# Patient Record
Sex: MALE | Race: WHITE | NOT HISPANIC OR LATINO | Employment: FULL TIME | URBAN - METROPOLITAN AREA
[De-identification: names, ages, dates, MRNs, and addresses within clinical notes are randomized per-mention and may not be internally consistent; named-entity substitution may affect disease eponyms.]

---

## 2017-02-01 ENCOUNTER — EXTERNAL ORDER RESULTS (OUTPATIENT)
Dept: HEALTH INFORMATION MANAGEMENT | Facility: CLINIC | Age: 47
End: 2017-02-01

## 2017-03-30 ENCOUNTER — OFFICE VISIT (OUTPATIENT)
Dept: FAMILY MEDICINE | Facility: CLINIC | Age: 47
End: 2017-03-30
Payer: COMMERCIAL

## 2017-03-30 VITALS
HEIGHT: 70 IN | BODY MASS INDEX: 38.65 KG/M2 | DIASTOLIC BLOOD PRESSURE: 85 MMHG | SYSTOLIC BLOOD PRESSURE: 138 MMHG | WEIGHT: 270 LBS | TEMPERATURE: 97.6 F | HEART RATE: 83 BPM

## 2017-03-30 DIAGNOSIS — E78.5 HYPERLIPIDEMIA LDL GOAL <100: ICD-10-CM

## 2017-03-30 DIAGNOSIS — E11.9 TYPE 2 DIABETES MELLITUS WITHOUT COMPLICATION, WITHOUT LONG-TERM CURRENT USE OF INSULIN (H): Primary | ICD-10-CM

## 2017-03-30 DIAGNOSIS — I10 ESSENTIAL HYPERTENSION WITH GOAL BLOOD PRESSURE LESS THAN 140/90: ICD-10-CM

## 2017-03-30 DIAGNOSIS — E66.9 OBESITY, UNSPECIFIED OBESITY SEVERITY, UNSPECIFIED OBESITY TYPE: ICD-10-CM

## 2017-03-30 LAB
ALBUMIN SERPL-MCNC: 3.9 G/DL (ref 3.4–5)
ALP SERPL-CCNC: 68 U/L (ref 40–150)
ALT SERPL W P-5'-P-CCNC: 46 U/L (ref 0–70)
ANION GAP SERPL CALCULATED.3IONS-SCNC: 9 MMOL/L (ref 3–14)
AST SERPL W P-5'-P-CCNC: 28 U/L (ref 0–45)
BILIRUB SERPL-MCNC: 0.5 MG/DL (ref 0.2–1.3)
BUN SERPL-MCNC: 11 MG/DL (ref 7–30)
CALCIUM SERPL-MCNC: 8.8 MG/DL (ref 8.5–10.1)
CHLORIDE SERPL-SCNC: 101 MMOL/L (ref 94–109)
CHOLEST SERPL-MCNC: 174 MG/DL
CO2 SERPL-SCNC: 26 MMOL/L (ref 20–32)
CREAT SERPL-MCNC: 0.7 MG/DL (ref 0.66–1.25)
GFR SERPL CREATININE-BSD FRML MDRD: ABNORMAL ML/MIN/1.7M2
GLUCOSE SERPL-MCNC: 239 MG/DL (ref 70–99)
HBA1C MFR BLD: 10.7 % (ref 4.3–6)
HDLC SERPL-MCNC: 45 MG/DL
LDLC SERPL CALC-MCNC: 84 MG/DL
NONHDLC SERPL-MCNC: 129 MG/DL
POTASSIUM SERPL-SCNC: 3.5 MMOL/L (ref 3.4–5.3)
PROT SERPL-MCNC: 7.8 G/DL (ref 6.8–8.8)
SODIUM SERPL-SCNC: 136 MMOL/L (ref 133–144)
TRIGL SERPL-MCNC: 226 MG/DL

## 2017-03-30 PROCEDURE — 80053 COMPREHEN METABOLIC PANEL: CPT | Performed by: FAMILY MEDICINE

## 2017-03-30 PROCEDURE — 36415 COLL VENOUS BLD VENIPUNCTURE: CPT | Performed by: FAMILY MEDICINE

## 2017-03-30 PROCEDURE — 80061 LIPID PANEL: CPT | Performed by: FAMILY MEDICINE

## 2017-03-30 PROCEDURE — 99214 OFFICE O/P EST MOD 30 MIN: CPT | Performed by: FAMILY MEDICINE

## 2017-03-30 PROCEDURE — 83036 HEMOGLOBIN GLYCOSYLATED A1C: CPT | Performed by: FAMILY MEDICINE

## 2017-03-30 RX ORDER — GLIPIZIDE 5 MG/1
5 TABLET ORAL
Qty: 180 TABLET | Refills: 1 | Status: SHIPPED | OUTPATIENT
Start: 2017-03-30 | End: 2017-09-25

## 2017-03-30 RX ORDER — AMLODIPINE BESYLATE 10 MG/1
10 TABLET ORAL DAILY
Qty: 90 TABLET | Refills: 1 | Status: SHIPPED | OUTPATIENT
Start: 2017-03-30 | End: 2017-12-27

## 2017-03-30 RX ORDER — LISINOPRIL AND HYDROCHLOROTHIAZIDE 20; 25 MG/1; MG/1
2 TABLET ORAL DAILY
Qty: 180 TABLET | Refills: 1 | Status: SHIPPED | OUTPATIENT
Start: 2017-03-30 | End: 2017-12-27

## 2017-03-30 RX ORDER — SIMVASTATIN 20 MG
20 TABLET ORAL AT BEDTIME
Qty: 90 TABLET | Refills: 1 | Status: SHIPPED | OUTPATIENT
Start: 2017-03-30 | End: 2017-09-25

## 2017-03-30 RX ORDER — CARVEDILOL 25 MG/1
25 TABLET ORAL 2 TIMES DAILY WITH MEALS
Qty: 180 TABLET | Refills: 1 | Status: SHIPPED | OUTPATIENT
Start: 2017-03-30 | End: 2017-12-27

## 2017-03-30 ASSESSMENT — PAIN SCALES - GENERAL: PAINLEVEL: NO PAIN (0)

## 2017-03-30 NOTE — PROGRESS NOTES
SUBJECTIVE:                                                    Davie Banuelos is a 46 year old male who presents to clinic today for the following health issues:       Diabetes Follow-up      Patient is checking blood sugars: daily averaging from 130-240's    Diabetic concerns: blood sugar frequently over 200     Symptoms of hypoglycemia (low blood sugar): none     Paresthesias (numbness or burning in feet) or sores: No     Date of last diabetic eye exam: 02/2017       Amount of exercise or physical activity: 6-7 days/week for an average of 30-45 minutes    Problems taking medications regularly: No    Medication side effects: none    Diet: low salt and low fat/cholesterol      none    Sugars higher    Living out of hotel since in Oct     Working in [place].    Done with that project now    Mostly eating out    Problem list and histories reviewed & adjusted, as indicated.  Additional history: as documented         Reviewed and updated as needed this visit by clinical staff       Reviewed and updated as needed this visit by Provider          high 100s to low 200s    Blood pressure 130s over 80s often  142 systolic this am   No chest pain / breathing problems      Exercise some     Still active job construction    Saw eye doctor a month ago, normal.  No diabetes change in eye.  Done at outside clinic.       Physical Exam   Constitutional: He is oriented to person, place, and time and well-developed, well-nourished, and in no distress. No distress.   HENT:   Head: Normocephalic and atraumatic.   Eyes: Conjunctivae are normal.   Neck: Carotid bruit is not present.   Cardiovascular: Normal rate, regular rhythm, normal heart sounds and intact distal pulses.  Exam reveals no gallop and no friction rub.    No murmur heard.  Pulmonary/Chest: Effort normal and breath sounds normal. No respiratory distress. He has no wheezes. He has no rales.   Musculoskeletal: He exhibits no edema.   Neurological: He is alert and  oriented to person, place, and time.   Skin: He is not diaphoretic.   Psychiatric: Mood and affect normal.       ASSESSMENT / PLAN:  (E11.9) Type 2 diabetes mellitus without complication, without long-term current use of insulin (H)  (primary encounter diagnosis)  Comment: sugars high recently.  Expect hemoglobin a1c to be high also.  Refill current meds and add 3rd oral agent.  Discussed in detail.  Then see us in 2-3 months   Plan: glipiZIDE (GLUCOTROL) 5 MG tablet, metFORMIN         (GLUCOPHAGE) 1000 MG tablet, sitagliptin         (JANUVIA) 25 MG tablet, Hemoglobin A1c             (I10) Essential hypertension with goal blood pressure less than 140/90  Comment: on recheck barely at goal   Plan: lisinopril-hydrochlorothiazide         (PRINZIDE/ZESTORETIC) 20-25 MG per tablet,         amLODIPine (NORVASC) 10 MG tablet, carvedilol         (COREG) 25 MG tablet        Refill same meds    (E78.5) Hyperlipidemia LDL goal <100  Comment: fasting today   Plan: simvastatin (ZOCOR) 20 MG tablet, Lipid panel         reflex to direct LDL, Comprehensive metabolic         panel        Refill med recheck labs     (E66.9) Obesity, unspecified obesity severity, unspecified obesity type  Comment: wt is up; eating out lots recently, should be better now   Plan: keep working on healthy diet/exercise and wt loss          I reviewed the patient's medications, allergies, medical history, family history, and social history.    Sean Dougherty MD

## 2017-03-30 NOTE — MR AVS SNAPSHOT
"              After Visit Summary   3/30/2017    Davie Banuelos    MRN: [id number]           Patient Information     Date Of Birth          1970        Visit Information        Provider Department      3/30/2017 6:40 AM Sean Dougherty MD [facility]        Today's Diagnoses     Essential hypertension with goal blood pressure less than 140/90        Hyperlipidemia LDL goal <100        Type 2 diabetes mellitus without complication, without long-term current use of insulin (H)          Care Instructions    Stay on same medications    Add the new once daily pill Januvia ( sitagliptin )    Keep working on healthy diet/exercise and wt loss    Plan to see us in 2-3 months           Follow-ups after your visit        Who to contact     If you have questions or need follow up information about today's clinic visit or your schedule please contact [facility] directly at [phone].  Normal or non-critical lab and imaging results will be communicated to you by MyChart, letter or phone within 4 business days after the clinic has received the results. If you do not hear from us within 7 days, please contact the clinic through MyChart or phone. If you have a critical or abnormal lab result, we will notify you by phone as soon as possible.  Submit refill requests through Kiip or call your pharmacy and they will forward the refill request to us. Please allow 3 business days for your refill to be completed.          Additional Information About Your Visit        MyChart Information     Kiip lets you send messages to your doctor, view your test results, renew your prescriptions, schedule appointments and more. To sign up, go to [email or web address] . Click on \"Log in\" on the left side of the screen, which will take you to the Welcome page. Then click on \"Sign up Now\" on the right side of the page.     You will be asked to enter the access code listed below, as well as " "some personal information. Please follow the directions to create your username and password.     Your access code is: [email or web address]  Expires: 2017  7:04 AM     Your access code will  in 90 days. If you need help or a new code, please call your [facility] clinic or [phone].        Care EveryWhere ID     This is your Care EveryWhere ID. This could be used by other organizations to access your [facility] medical records  [id number]        Your Vitals Were     Pulse Temperature Height BMI (Body Mass Index)          83 97.6  F (36.4  C) (Oral) 5' 10\" (1.778 m) 38.74 kg/m2         Blood Pressure from Last 3 Encounters:   17 138/85   16 136/86   06/30/15 154/86    Weight from Last 3 Encounters:   17 270 lb (122.5 kg)   16 258 lb (117 kg)   06/30/15 264 lb (119.7 kg)              We Performed the Following     Comprehensive metabolic panel     Hemoglobin A1c     Lipid panel reflex to direct LDL          Today's Medication Changes          These changes are accurate as of: 3/30/17  7:04 AM.  If you have any questions, ask your nurse or doctor.               Start taking these medicines.        Dose/Directions    sitagliptin 25 MG tablet   Commonly known as:  JANUVIA   Used for:  Type 2 diabetes mellitus without complication, without long-term current use of insulin (H)   Started by:  Sean Dougherty MD        Dose:  25 mg   Take 1 tablet (25 mg) by mouth daily   Quantity:  90 tablet   Refills:  0            Where to get your medicines      These medications were sent to [id number] - [place], [place] - [address] [place] [address] AT [facility]  [address], [place] [address]     Phone:  [phone]     amLODIPine 10 MG tablet    carvedilol 25 MG tablet    glipiZIDE 5 MG tablet    lisinopril-hydrochlorothiazide 20-25 MG per tablet    metFORMIN 1000 MG tablet    simvastatin 20 MG tablet    sitagliptin 25 MG tablet                " Primary Care Provider Office Phone # Fax #    Sean Dougherty -[phone]       [facility] [address]  [place]. \Hospital [facility] Outlook.\"" [id number]        Thank you!     Thank you for choosing [facility]  for your care. Our goal is always to provide you with excellent care. Hearing back from our patients is one way we can continue to improve our services. Please take a few minutes to complete the written survey that you may receive in the mail after your visit with us. Thank you!             Your Updated Medication List - Protect others around you: Learn how to safely use, store and throw away your medicines at www.disposemymeds.org.          This list is accurate as of: 3/30/17  7:04 AM.  Always use your most recent med list.                   Brand Name Dispense Instructions for use    ACCU-CHEK SOFTCLIX LANCETS      Test 2 times daily       amLODIPine 10 MG tablet    NORVASC    90 tablet    Take 1 tablet (10 mg) by mouth daily       aspirin 81 MG tablet      Take 1 tablet by mouth daily.       blood glucose monitoring test strip    ACCU-CHEK SWETA    100 each    Test twice daily (labile sugars)       carvedilol 25 MG tablet    COREG    180 tablet    Take 1 tablet (25 mg) by mouth 2 times daily (with meals)       glipiZIDE 5 MG tablet    GLUCOTROL    180 tablet    Take 1 tablet (5 mg) by mouth 2 times daily (before meals)       Krill Oil Caps      Take  by mouth daily.       lisinopril-hydrochlorothiazide 20-25 MG per tablet    PRINZIDE/ZESTORETIC    180 tablet    Take 2 tablets by mouth daily       metFORMIN 1000 MG tablet    GLUCOPHAGE    180 tablet    Take 1 tablet (1,000 mg) by mouth 2 times daily (with meals)       simvastatin 20 MG tablet    ZOCOR    90 tablet    Take 1 tablet (20 mg) by mouth At Bedtime       sitagliptin 25 MG tablet    JANUVIA    90 tablet    Take 1 tablet (25 mg) by mouth daily

## 2017-03-30 NOTE — LETTER
[facility]  [address] [place]  [address]  [phone]        March 31, 2017    Davie Banuelos  [address] [facility] [address]        Dear Davie,    As expected the diabetes test ( hemoglobin a1c ) is very high.     Start the new diabetes pill, continue the old ones, and keep working on healthy diet/exercise and weight loss.     See us in 2-3 months.     Other labs are okay.    Results for orders placed or performed in visit on 03/30/17   Lipid panel reflex to direct LDL   Result Value Ref Range    Cholesterol 174 <200 mg/dL    Triglycerides 226 (H) <150 mg/dL    HDL Cholesterol 45 >39 mg/dL    LDL Cholesterol Calculated 84 <100 mg/dL    Non HDL Cholesterol 129 <130 mg/dL   Hemoglobin A1c   Result Value Ref Range    Hemoglobin A1C 10.7 (H) 4.3 - 6.0 %   Comprehensive metabolic panel   Result Value Ref Range    Sodium 136 133 - 144 mmol/L    Potassium 3.5 3.4 - 5.3 mmol/L    Chloride 101 94 - 109 mmol/L    Carbon Dioxide 26 20 - 32 mmol/L    Anion Gap 9 3 - 14 mmol/L    Glucose 239 (H) 70 - 99 mg/dL    Urea Nitrogen 11 7 - 30 mg/dL    Creatinine 0.70 0.66 - 1.25 mg/dL    GFR Estimate >90  Non  GFR Calc   >60 mL/min/1.7m2    GFR Estimate If Black >90   GFR Calc   >60 mL/min/1.7m2    Calcium 8.8 8.5 - 10.1 mg/dL    Bilirubin Total 0.5 0.2 - 1.3 mg/dL    Albumin 3.9 3.4 - 5.0 g/dL    Protein Total 7.8 6.8 - 8.8 g/dL    Alkaline Phosphatase 68 40 - 150 U/L    ALT 46 0 - 70 U/L    AST 28 0 - 45 U/L       If you have any questions please call the clinic at [phone].    Sincerely,    Sean GUILLENL

## 2017-03-30 NOTE — NURSING NOTE
"Chief Complaint   Patient presents with     Diabetes     Health Maintenance       Initial BP (!) 161/92 (BP Location: Left arm, Patient Position: Chair, Cuff Size: Adult Regular)  Pulse 83  Temp 97.6  F (36.4  C) (Oral)  Ht 5' 10\" (1.778 m)  Wt 270 lb (122.5 kg)  BMI 38.74 kg/m2 Estimated body mass index is 38.74 kg/(m^2) as calculated from the following:    Height as of this encounter: 5' 10\" (1.778 m).    Weight as of this encounter: 270 lb (122.5 kg).  Medication Reconciliation: complete   Marisela Milligan CMA      "

## 2017-03-30 NOTE — PATIENT INSTRUCTIONS
Stay on same medications    Add the new once daily pill Januvia ( sitagliptin )    Keep working on healthy diet/exercise and wt loss    Plan to see us in 2-3 months

## 2017-03-31 NOTE — PROGRESS NOTES
As expected the diabetes test ( hemoglobin a1c ) is very high.    Start the new diabetes pill, continue the old ones, and keep working on healthy diet/exercise and weight loss.    See us in 2-3 months.    Other labs are okay.    Sean Dougherty MD

## 2017-04-03 ENCOUNTER — TELEPHONE (OUTPATIENT)
Dept: FAMILY MEDICINE | Facility: CLINIC | Age: 47
End: 2017-04-03

## 2017-04-03 DIAGNOSIS — E11.9 TYPE 2 DIABETES MELLITUS WITHOUT COMPLICATION, WITHOUT LONG-TERM CURRENT USE OF INSULIN (H): ICD-10-CM

## 2017-04-03 NOTE — TELEPHONE ENCOUNTER
Called pharmacy, patient picked up on the 31st. Used a coupon so no co-pay. Girlfriend says that they downloaded a coupon for 1 month free. It is usually 400.00 per month   Needing an alternative to Januvia.    E11.9) Type 2 diabetes mellitus without complication, without long-term current use of insulin (H) (primary encounter diagnosis)  Comment: sugars high recently. Expect hemoglobin a1c to be high also. Refill current meds and add 3rd oral agent. Discussed in detail. Then see us in 2-3 months   Plan: glipiZIDE (GLUCOTROL) 5 MG tablet, metFORMIN   (GLUCOPHAGE) 1000 MG tablet, sitagliptin   (JANUVIA) 25 MG tablet, Hemoglobin A1c    Routed to provider to advise.  Lorelei Ureña RN  [facility]

## 2017-04-03 NOTE — TELEPHONE ENCOUNTER
"Faxed message from pharmacy:  \"Medication is too expensive.  Patient would like a cheaper alternative.  Thanks\"  "

## 2017-04-09 RX ORDER — SAXAGLIPTIN 2.5 MG/1
2.5 TABLET, FILM COATED ORAL DAILY
Qty: 30 TABLET | Refills: 2 | Status: SHIPPED | OUTPATIENT
Start: 2017-04-09 | End: 2017-04-10

## 2017-04-09 NOTE — TELEPHONE ENCOUNTER
I sent in an alternative, one month with a couple refills.  See us in clinic in 2-3 months.    Please inform patient.    Sean Dougherty MD

## 2017-04-10 ENCOUNTER — TELEPHONE (OUTPATIENT)
Dept: FAMILY MEDICINE | Facility: CLINIC | Age: 47
End: 2017-04-10

## 2017-04-10 DIAGNOSIS — E11.9 TYPE 2 DIABETES MELLITUS WITHOUT COMPLICATION, WITHOUT LONG-TERM CURRENT USE OF INSULIN (H): ICD-10-CM

## 2017-04-10 RX ORDER — SAXAGLIPTIN 2.5 MG/1
2.5 TABLET, FILM COATED ORAL DAILY
Qty: 30 TABLET | Refills: 2 | Status: SHIPPED | OUTPATIENT
Start: 2017-04-10 | End: 2017-12-27

## 2017-04-10 NOTE — TELEPHONE ENCOUNTER
PA is needed for the medication, Onglyza 2.5 mg.     Insurance has been called.  Alternatives that are covered are: na        Would you like to start PA or Rx alternative medication?    If PA, please list all medications this patient has tried along with any contraindications that may have been experienced in the past. Thank you.    Pharmacy: [facility]   Phone: [phone]    Insurance Plan: [employer]  Phone:   Pt ID:   Group:   PA started through cover my meds will wait for response from insurance  Forwarded to Dr. Dougherty for review.

## 2017-04-10 NOTE — TELEPHONE ENCOUNTER
Pt's girlfriend called back and said she could not understand message left for him. Did not see authorization to discuss so I told her I would take a message to have someone call back.  .Mirta Farrar  Patient Representative

## 2017-04-10 NOTE — TELEPHONE ENCOUNTER
Patient's girl friend is calling to check the status of the medication that was going to be subscribed instead of Januvia.  Patient states that the pharmacy has not received anything yet.  Patient uses [facility].    Please call [phone]     Samantha Martinez  Patient Representative

## 2017-04-11 NOTE — TELEPHONE ENCOUNTER
PA denied.  Reason given:      Patient has not failed trial of formulary medication: Jentadueto or Tradjenta  Patient notified:  Letter sent by insurance company and letter forwarded to [employer]. I did call and notify pharmacy.  Will send to Dr. Dougherty for his review  Marisela Milligan CMA

## 2017-04-11 NOTE — TELEPHONE ENCOUNTER
Called and spoke with Alda. Explained that  changed to alternative. They will finish out the 1 month of Onglyza that they currently have, then switch to Tradjenta, Dr. Dougherty is fine with that.     Called pharmacy and had them run the new Rx for Tradjenta.  However the pt has a copay of $394, due to deductible.     After reviewing the chart, I noticed that the HELEN in contacts is NOT for Alda. It is an error and is for [facility].     Tried to call the pt back to inform him of this error. Unable to reach him and left message for him to call the clinic back.    Alda called back and I asked her for another number to reach the pt, she gave me [phone].    I tried to call the pt at that number, but no answer and no voicemail is set up.    Called pt back at number listed in chart, Alda answered, I asked her to have the pt call the clinic back on the TC line at [phone]. If no one answers leave a message stating it's okay for us to have talked to Alda today.     Cherry Rocha MA

## 2017-04-11 NOTE — TELEPHONE ENCOUNTER
Patient returning call, left v/m on TC line, giving OK to speak with Isabelle.  Can call him with questions with [phone].

## 2017-04-17 ENCOUNTER — TELEPHONE (OUTPATIENT)
Dept: FAMILY MEDICINE | Facility: CLINIC | Age: 47
End: 2017-04-17

## 2017-04-17 DIAGNOSIS — E11.9 TYPE 2 DIABETES MELLITUS WITHOUT COMPLICATION, WITHOUT LONG-TERM CURRENT USE OF INSULIN (H): Primary | ICD-10-CM

## 2017-04-17 RX ORDER — LIRAGLUTIDE 6 MG/ML
1.2 INJECTION SUBCUTANEOUS DAILY
Qty: 6 ML | Refills: 2 | Status: SHIPPED | OUTPATIENT
Start: 2017-04-17 | End: 2017-12-27

## 2017-04-17 NOTE — TELEPHONE ENCOUNTER
Reason for Call:  Other     Detailed comments: patient had a medication that he could not afford so Dr Dougherty had prescribed a new one (linagliptin (TRADJENTA) 5 MG TABS tablet) this script cost $394 and the patient cannot afford this one too.  Please call patient to see if he can get something else.    Phone Number Patient can be reached at: Home number on file [phone] (home)    Best Time: any    Can we leave a detailed message on this number? YES    Call taken on 4/17/2017 at 10:30 AM by Gloria Calvillo

## 2017-04-17 NOTE — TELEPHONE ENCOUNTER
I sent in a different med.  It is Victoza which is injectable once daily but is not insulin.    Please inform patient.    Sean Dougherty MD

## 2017-04-18 NOTE — TELEPHONE ENCOUNTER
Patients significant other, Alda called back returning call from yesterday.    I informed her that we do not have HELEN for us to talk with her.  Told her that Dr Dougherty sent in a new script to pharmacy.    Patient hung up on me when I was talking to her about the Release for us to talk to you about patient.    Gloria Calvillo

## 2017-04-18 NOTE — TELEPHONE ENCOUNTER
Called patient at [phone] (home) .  We do not have a HELEN to talk to Alda.  Left message on voicemail to return phone call to triage.  Gege Benitez RN [facility] Triage.

## 2017-08-09 ENCOUNTER — TELEPHONE (OUTPATIENT)
Dept: FAMILY MEDICINE | Facility: CLINIC | Age: 47
End: 2017-08-09

## 2017-08-09 NOTE — TELEPHONE ENCOUNTER
I called and notified patient he will call and schedule when he gets back in town  Marisela Milligan CMA

## 2017-08-09 NOTE — TELEPHONE ENCOUNTER
Patient on fail list for diabetes/ hemoglobin a1c..  We had seen him in spring and added additional, and he was to follow up in a couple months.  Thus due for clinic appointment.    Please inform patient.    Sean Dougherty MD

## 2017-09-25 ENCOUNTER — TELEPHONE (OUTPATIENT)
Dept: FAMILY MEDICINE | Facility: CLINIC | Age: 47
End: 2017-09-25

## 2017-09-25 DIAGNOSIS — E11.9 TYPE 2 DIABETES MELLITUS WITHOUT COMPLICATION, WITHOUT LONG-TERM CURRENT USE OF INSULIN (H): ICD-10-CM

## 2017-09-25 DIAGNOSIS — E78.5 HYPERLIPIDEMIA LDL GOAL <100: ICD-10-CM

## 2017-09-25 NOTE — TELEPHONE ENCOUNTER
glipiZIDE (GLUCOTROL) 5 MG tablet         Last Written Prescription Date: 3-30-17  Last Fill Quantity: 180, # refills: 1  Last Office Visit with [facility], [facility] or [facility] prescribing provider:  3-30-17        BP Readings from Last 3 Encounters:   03/30/17 138/85   06/24/16 136/86   06/30/15 154/86     Lab Results   Component Value Date    MICROL 28 06/24/2016     Lab Results   Component Value Date    UMALCR 31.39 06/24/2016     Creatinine   Date Value Ref Range Status   03/30/2017 0.70 0.66 - 1.25 mg/dL Final   ]  GFR Estimate   Date Value Ref Range Status   03/30/2017 >90  Non  GFR Calc   >60 mL/min/1.7m2 Final   06/24/2016 >90  Non  GFR Calc   >60 mL/min/1.7m2 Final   06/30/2015 >90  Non  GFR Calc   >60 mL/min/1.7m2 Final     GFR Estimate If Black   Date Value Ref Range Status   03/30/2017 >90   GFR Calc   >60 mL/min/1.7m2 Final   06/24/2016 >90   GFR Calc   >60 mL/min/1.7m2 Final   06/30/2015 >90   GFR Calc   >60 mL/min/1.7m2 Final     Lab Results   Component Value Date    CHOL 174 03/30/2017     Lab Results   Component Value Date    HDL 45 03/30/2017     Lab Results   Component Value Date    LDL 84 03/30/2017     Lab Results   Component Value Date    TRIG 226 03/30/2017     Lab Results   Component Value Date    CHOLHDLRATIO 3.6 06/30/2015     Lab Results   Component Value Date    AST 28 03/30/2017     Lab Results   Component Value Date    ALT 46 03/30/2017     Lab Results   Component Value Date    A1C 10.7 03/30/2017    A1C 9.8 06/24/2016    A1C 10.8 06/30/2015    A1C 9.0 10/01/2014    A1C 7.9 10/11/2013     Potassium   Date Value Ref Range Status   03/30/2017 3.5 3.4 - 5.3 mmol/L Final     metFORMIN (GLUCOPHAGE) 1000 MG tablet         Last Written Prescription Date: 3-30-17  Last Fill Quantity: 180, # refills: 1  Last Office Visit with [facility], [facility] or [facility] prescribing provider:  3-30-17        BP Readings from Last 3  Encounters:   03/30/17 138/85   06/24/16 136/86   06/30/15 154/86     Lab Results   Component Value Date    MICROL 28 06/24/2016     Lab Results   Component Value Date    UMALCR 31.39 06/24/2016     Creatinine   Date Value Ref Range Status   03/30/2017 0.70 0.66 - 1.25 mg/dL Final   ]  GFR Estimate   Date Value Ref Range Status   03/30/2017 >90  Non  GFR Calc   >60 mL/min/1.7m2 Final   06/24/2016 >90  Non  GFR Calc   >60 mL/min/1.7m2 Final   06/30/2015 >90  Non  GFR Calc   >60 mL/min/1.7m2 Final     GFR Estimate If Black   Date Value Ref Range Status   03/30/2017 >90   GFR Calc   >60 mL/min/1.7m2 Final   06/24/2016 >90   GFR Calc   >60 mL/min/1.7m2 Final   06/30/2015 >90   GFR Calc   >60 mL/min/1.7m2 Final     Lab Results   Component Value Date    CHOL 174 03/30/2017     Lab Results   Component Value Date    HDL 45 03/30/2017     Lab Results   Component Value Date    LDL 84 03/30/2017     Lab Results   Component Value Date    TRIG 226 03/30/2017     Lab Results   Component Value Date    CHOLHDLRATIO 3.6 06/30/2015     Lab Results   Component Value Date    AST 28 03/30/2017     Lab Results   Component Value Date    ALT 46 03/30/2017     Lab Results   Component Value Date    A1C 10.7 03/30/2017    A1C 9.8 06/24/2016    A1C 10.8 06/30/2015    A1C 9.0 10/01/2014    A1C 7.9 10/11/2013     Potassium   Date Value Ref Range Status   03/30/2017 3.5 3.4 - 5.3 mmol/L Final     simvastatin (ZOCOR) 20 MG tablet     Last Written Prescription Date: 3-30-17  Last Fill Quantity: 90, # refills: 1  Last Office Visit with [facility] or [facility] prescribing provider: 3-30-17       Lab Results   Component Value Date    CHOL 174 03/30/2017     Lab Results   Component Value Date    HDL 45 03/30/2017     Lab Results   Component Value Date    LDL 84 03/30/2017     Lab Results   Component Value Date    TRIG 226 03/30/2017     Lab Results   Component  Value Date    LUCIUS 3.6 06/30/2015

## 2017-09-26 ENCOUNTER — TELEPHONE (OUTPATIENT)
Dept: FAMILY MEDICINE | Facility: CLINIC | Age: 47
End: 2017-09-26

## 2017-09-26 DIAGNOSIS — E11.9 TYPE 2 DIABETES MELLITUS WITHOUT COMPLICATION, WITHOUT LONG-TERM CURRENT USE OF INSULIN (H): ICD-10-CM

## 2017-09-26 DIAGNOSIS — E78.5 HYPERLIPIDEMIA LDL GOAL <100: ICD-10-CM

## 2017-09-26 RX ORDER — GLIPIZIDE 5 MG/1
TABLET ORAL
Qty: 60 TABLET | Refills: 0 | Status: SHIPPED | OUTPATIENT
Start: 2017-09-26 | End: 2017-09-26

## 2017-09-26 RX ORDER — SIMVASTATIN 20 MG
TABLET ORAL
Qty: 30 TABLET | Refills: 0 | Status: SHIPPED | OUTPATIENT
Start: 2017-09-26 | End: 2017-09-26

## 2017-09-26 RX ORDER — SIMVASTATIN 20 MG
TABLET ORAL
Qty: 90 TABLET | Refills: 0 | Status: SHIPPED | OUTPATIENT
Start: 2017-09-26 | End: 2017-12-27

## 2017-09-26 RX ORDER — GLIPIZIDE 5 MG/1
TABLET ORAL
Qty: 180 TABLET | Refills: 0 | Status: SHIPPED | OUTPATIENT
Start: 2017-09-26 | End: 2017-12-27

## 2017-09-26 NOTE — TELEPHONE ENCOUNTER
I see one month supply of glipizide, metformin, and simvastatin were sent to pharmacy by PCP today.  Patient requesting 3 mos supply, last seen in clinic in March 2017.  Due for follow up.    Routed to Dr. Dougherty to advise on longer fills as requested.    Caterina Jiang RN  [facility]

## 2017-09-26 NOTE — TELEPHONE ENCOUNTER
Okayed this but he needs to be seen soon in clinic.  No further refills unless seen.    Please inform patient.    Sean Dougherty MD

## 2017-09-26 NOTE — TELEPHONE ENCOUNTER
Message left on home number for patient to call back TC line.  NTBS for DM check and labs with PCP.    Dorothy DU

## 2017-09-27 NOTE — TELEPHONE ENCOUNTER
Spoke with female, knew about message.  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  Does know that he needs to come in.  She will inform patient and have him call to schedule.

## 2017-12-26 DIAGNOSIS — I10 ESSENTIAL HYPERTENSION WITH GOAL BLOOD PRESSURE LESS THAN 140/90: ICD-10-CM

## 2017-12-26 RX ORDER — AMLODIPINE BESYLATE 10 MG/1
TABLET ORAL
Qty: 90 TABLET | Refills: 0 | Status: CANCELLED | OUTPATIENT
Start: 2017-12-26

## 2017-12-26 RX ORDER — CARVEDILOL 25 MG/1
TABLET ORAL
Qty: 180 TABLET | Refills: 0 | Status: CANCELLED | OUTPATIENT
Start: 2017-12-26

## 2017-12-26 RX ORDER — LISINOPRIL AND HYDROCHLOROTHIAZIDE 20; 25 MG/1; MG/1
TABLET ORAL
Qty: 180 TABLET | Refills: 0 | Status: CANCELLED | OUTPATIENT
Start: 2017-12-26

## 2017-12-27 ENCOUNTER — OFFICE VISIT (OUTPATIENT)
Dept: FAMILY MEDICINE | Facility: CLINIC | Age: 47
End: 2017-12-27
Payer: COMMERCIAL

## 2017-12-27 VITALS
SYSTOLIC BLOOD PRESSURE: 162 MMHG | BODY MASS INDEX: 37.74 KG/M2 | TEMPERATURE: 97.4 F | WEIGHT: 263 LBS | HEART RATE: 86 BPM | DIASTOLIC BLOOD PRESSURE: 92 MMHG | OXYGEN SATURATION: 97 %

## 2017-12-27 DIAGNOSIS — E66.9 OBESITY, UNSPECIFIED OBESITY SEVERITY, UNSPECIFIED OBESITY TYPE: ICD-10-CM

## 2017-12-27 DIAGNOSIS — I10 ESSENTIAL HYPERTENSION WITH GOAL BLOOD PRESSURE LESS THAN 140/90: ICD-10-CM

## 2017-12-27 DIAGNOSIS — E11.9 TYPE 2 DIABETES MELLITUS WITHOUT COMPLICATION, WITHOUT LONG-TERM CURRENT USE OF INSULIN (H): Primary | ICD-10-CM

## 2017-12-27 DIAGNOSIS — E78.5 HYPERLIPIDEMIA LDL GOAL <100: ICD-10-CM

## 2017-12-27 PROCEDURE — 99207 C FOOT EXAM  NO CHARGE: CPT | Mod: 25 | Performed by: FAMILY MEDICINE

## 2017-12-27 PROCEDURE — 99214 OFFICE O/P EST MOD 30 MIN: CPT | Performed by: FAMILY MEDICINE

## 2017-12-27 RX ORDER — CARVEDILOL 25 MG/1
25 TABLET ORAL 2 TIMES DAILY WITH MEALS
Qty: 180 TABLET | Refills: 1 | Status: SHIPPED | OUTPATIENT
Start: 2017-12-27 | End: 2018-06-20

## 2017-12-27 RX ORDER — AMLODIPINE BESYLATE 10 MG/1
10 TABLET ORAL DAILY
Qty: 90 TABLET | Refills: 1 | Status: SHIPPED | OUTPATIENT
Start: 2017-12-27 | End: 2018-06-20

## 2017-12-27 RX ORDER — GLIPIZIDE 5 MG/1
TABLET ORAL
Qty: 180 TABLET | Refills: 0 | Status: CANCELLED | OUTPATIENT
Start: 2017-12-27

## 2017-12-27 RX ORDER — SIMVASTATIN 20 MG
TABLET ORAL
Qty: 90 TABLET | Refills: 1 | Status: SHIPPED | OUTPATIENT
Start: 2017-12-27 | End: 2018-06-20

## 2017-12-27 RX ORDER — GLIPIZIDE 10 MG/1
10 TABLET ORAL
Qty: 180 TABLET | Refills: 1 | Status: SHIPPED | OUTPATIENT
Start: 2017-12-27 | End: 2018-06-20

## 2017-12-27 RX ORDER — LISINOPRIL AND HYDROCHLOROTHIAZIDE 20; 25 MG/1; MG/1
2 TABLET ORAL DAILY
Qty: 180 TABLET | Refills: 1 | Status: SHIPPED | OUTPATIENT
Start: 2017-12-27 | End: 2018-06-20

## 2017-12-27 ASSESSMENT — PAIN SCALES - GENERAL: PAINLEVEL: NO PAIN (0)

## 2017-12-27 NOTE — MR AVS SNAPSHOT
"              After Visit Summary   12/27/2017    Davie Banuelos    MRN: [id number]           Patient Information     Date Of Birth          1970        Visit Information        Provider Department      12/27/2017 7:40 AM Sean Dougherty MD [facility]        Today's Diagnoses     Type 2 diabetes mellitus without complication, without long-term current use of insulin (H)        Hyperlipidemia LDL goal <100        Essential hypertension with goal blood pressure less than 140/90          Care Instructions    Increase glipizide to 10 mg 2x daily    Restart blood pressure med     Other meds as is    See us in 2 months in clinic.  If hemoglobin a1c is not to goal we will add an additional diabetes med    Keep working on healthy diet/exercise and wt loss    We will send you lab results          Follow-ups after your visit        Who to contact     If you have questions or need follow up information about today's clinic visit or your schedule please contact [facility] directly at [phone].  Normal or non-critical lab and imaging results will be communicated to you by MyChart, letter or phone within 4 business days after the clinic has received the results. If you do not hear from us within 7 days, please contact the clinic through [employer] or phone. If you have a critical or abnormal lab result, we will notify you by phone as soon as possible.  Submit refill requests through [employer] or call your pharmacy and they will forward the refill request to us. Please allow 3 business days for your refill to be completed.          Additional Information About Your Visit        [employer] Information     [employer] lets you send messages to your doctor, view your test results, renew your prescriptions, schedule appointments and more. To sign up, go to [email or web address] . Click on \"Log in\" on the left side of the screen, which will take you to the Welcome page. Then click on " "\"Sign up Now\" on the right side of the page.     You will be asked to enter the access code listed below, as well as some personal information. Please follow the directions to create your username and password.     Your access code is: [email or web address]  Expires: 3/27/2018  8:04 AM     Your access code will  in 90 days. If you need help or a new code, please call your [facility] clinic or [phone].        Care EveryWhere ID     This is your Care EveryWhere ID. This could be used by other organizations to access your [facility] medical records  [id number]        Your Vitals Were     Pulse Temperature Pulse Oximetry BMI (Body Mass Index)          86 97.4  F (36.3  C) (Oral) 97% 37.74 kg/m2         Blood Pressure from Last 3 Encounters:   17 (!) 162/92   17 138/85   16 136/86    Weight from Last 3 Encounters:   17 263 lb (119.3 kg)   17 270 lb (122.5 kg)   16 258 lb (117 kg)              We Performed the Following     FOOT EXAM          Today's Medication Changes          These changes are accurate as of: 17  8:04 AM.  If you have any questions, ask your nurse or doctor.               These medicines have changed or have updated prescriptions.        Dose/Directions    glipiZIDE 10 MG tablet   Commonly known as:  GLUCOTROL   This may have changed:  See the new instructions.   Used for:  Type 2 diabetes mellitus without complication, without long-term current use of insulin (H)   Changed by:  Sean Dougherty MD        Dose:  10 mg   Take 1 tablet (10 mg) by mouth 2 times daily (before meals)   Quantity:  180 tablet   Refills:  1       metFORMIN 1000 MG tablet   Commonly known as:  GLUCOPHAGE   This may have changed:  See the new instructions.   Used for:  Type 2 diabetes mellitus without complication, without long-term current use of insulin (H)   Changed by:  Sean Dougherty MD        TAKE 1 TABLET BY MOUTH TWICE DAILY WITH MEALS   Quantity:  180 tablet   Refills:  1    "    simvastatin 20 MG tablet   Commonly known as:  ZOCOR   This may have changed:  See the new instructions.   Used for:  Hyperlipidemia LDL goal <100   Changed by:  Sean Dougherty MD        TAKE 1 TABLET(20 MG) BY MOUTH AT BEDTIME   Quantity:  90 tablet   Refills:  1            Where to get your medicines      These medications were sent to [facility] - [place], [place] - [address] [place] [address] AT [facility] [address]  [address] [place] [address], [place] [address]     Phone:  [phone]     amLODIPine 10 MG tablet    carvedilol 25 MG tablet    glipiZIDE 10 MG tablet    lisinopril-hydrochlorothiazide 20-25 MG per tablet    metFORMIN 1000 MG tablet    simvastatin 20 MG tablet                Primary Care Provider Office Phone # Fax #    Sean Dougherty [phone]       [address] [facility] [address]        Equal Access to Services     [facility] AH: Hadii aad ku hadasho Soomaali, waaxda luqadaha, qaybta kaalmada adeegyada, waxay idiin hayaan adeeg kharajacobo teresa . So [facility] [phone].    ATENCIÓN: Si habla español, tiene a garcía disposición servicios gratuitos de asistencia lingüística. Llame al [phone].    We comply with applicable federal civil rights laws and [place] laws. We do not discriminate on the basis of race, color, national origin, age, disability, sex, sexual orientation, or gender identity.            Thank you!     Thank you for choosing [facility]  for your care. Our goal is always to provide you with excellent care. Hearing back from our patients is one way we can continue to improve our services. Please take a few minutes to complete the written survey that you may receive in the mail after your visit with us. Thank you!             Your Updated Medication List - Protect others around you: Learn how to safely use, store and throw away your medicines at www.disposemymeds.org.          This list is  accurate as of: 12/27/17  8:04 AM.  Always use your most recent med list.                   Brand Name Dispense Instructions for use Diagnosis    ACCU-CHEK SOFTCLIX LANCETS      Test 2 times daily        amLODIPine 10 MG tablet    NORVASC    90 tablet    Take 1 tablet (10 mg) by mouth daily    Essential hypertension with goal blood pressure less than 140/90       aspirin 81 MG tablet      Take 1 tablet by mouth daily.        blood glucose monitoring test strip    ACCU-CHEK SWETA    100 each    Test twice daily (labile sugars)    Type 2 diabetes mellitus without complication (H)       carvedilol 25 MG tablet    COREG    180 tablet    Take 1 tablet (25 mg) by mouth 2 times daily (with meals)    Essential hypertension with goal blood pressure less than 140/90       glipiZIDE 10 MG tablet    GLUCOTROL    180 tablet    Take 1 tablet (10 mg) by mouth 2 times daily (before meals)    Type 2 diabetes mellitus without complication, without long-term current use of insulin (H)       Krill Oil Caps      Take  by mouth daily.        lisinopril-hydrochlorothiazide 20-25 MG per tablet    PRINZIDE/ZESTORETIC    180 tablet    Take 2 tablets by mouth daily    Essential hypertension with goal blood pressure less than 140/90       metFORMIN 1000 MG tablet    GLUCOPHAGE    180 tablet    TAKE 1 TABLET BY MOUTH TWICE DAILY WITH MEALS    Type 2 diabetes mellitus without complication, without long-term current use of insulin (H)       simvastatin 20 MG tablet    ZOCOR    90 tablet    TAKE 1 TABLET(20 MG) BY MOUTH AT BEDTIME    Hyperlipidemia LDL goal <100

## 2017-12-27 NOTE — PATIENT INSTRUCTIONS
Increase glipizide to 10 mg 2x daily    Restart blood pressure med     Other meds as is    See us in 2 months in clinic.  If hemoglobin a1c is not to goal we will add an additional diabetes med    Keep working on healthy diet/exercise and wt loss    We will send you lab results

## 2017-12-27 NOTE — PROGRESS NOTES
SUBJECTIVE:   Davie Banuelos is a 47 year old male who presents to clinic today for the following health issues:       Diabetes Follow-up    Patient is checking blood sugars: 1 times daily.    Blood sugar testing frequency justification: Uncontrolled diabetes  Results are as follows:       am - 150-320        Diabetic concerns: blood sugar frequently over 200     Symptoms of hypoglycemia (low blood sugar): none     Paresthesias (numbness or burning in feet) or sores: No     Date of last diabetic eye exam: 02/01/2017  BP Readings from Last 2 Encounters:   03/30/17 138/85   06/24/16 136/86     Hemoglobin A1C (%)   Date Value   03/30/2017 10.7 (H)   06/24/2016 9.8 (H)     LDL Cholesterol Calculated (mg/dL)   Date Value   03/30/2017 84   06/24/2016 87         Amount of exercise or physical activity: 2-3 days/week for an average of 30-45 minutes    Problems taking medications regularly: No    Medication side effects: none    Diet: regular (no restrictions)        None but has not been on medications due to being in [place] and he ran out    Problem list and histories reviewed & adjusted, as indicated.  Additional history: as documented         Reviewed and updated as needed this visit by clinical staffTobacco  Allergies  Meds  Problems  Med Hx  Surg Hx  Fam Hx  Soc Hx        Reviewed and updated as needed this visit by Provider          ran out of glipizide and lisinopril    Lots of processed foods    Some 300s recently on the sugars    This am 145    Had tetanus shot 2 years ago at company    Physical Exam   Constitutional: He is oriented to person, place, and time and well-developed, well-nourished, and in no distress. No distress.   HENT:   Head: Normocephalic and atraumatic.   Eyes: Conjunctivae are normal.   Neck: Carotid bruit is not present.   Cardiovascular: Normal rate, regular rhythm, normal heart sounds and intact distal pulses.  Exam reveals no gallop and no friction rub.    No murmur  "heard.  Pulmonary/Chest: Effort normal and breath sounds normal. No respiratory distress. He has no wheezes. He has no rales.   Musculoskeletal: He exhibits no edema.   Neurological: He is alert and oriented to person, place, and time.   Skin: He is not diaphoretic.   Psychiatric: Mood and affect normal.   foot exam normal bilat      ASSESSMENT / PLAN:  (E11.9) Type 2 diabetes mellitus without complication, without long-term current use of insulin (H)  (primary encounter diagnosis)  Comment: the \"gliptin\" class was too expensive for patient.  Will increase glipizide to 10 bid, keep metformin at 1000 bid, and then see us back in 2 months.   Plan: FOOT EXAM, metFORMIN (GLUCOPHAGE) 1000 MG         tablet, glipiZIDE (GLUCOTROL) 10 MG tablet             (E78.5) Hyperlipidemia LDL goal <100  Comment: needs refill   Plan: simvastatin (ZOCOR) 20 MG tablet             (I10) Essential hypertension with goal blood pressure less than 140/90  Comment: need to restart the med he is out of  Plan: lisinopril-hydrochlorothiazide         (PRINZIDE/ZESTORETIC) 20-25 MG per tablet,         amLODIPine (NORVASC) 10 MG tablet, carvedilol         (COREG) 25 MG tablet        Take all 3 daily     (E66.9) Obesity, unspecified obesity severity, unspecified obesity type  Comment: chronic   Plan: keep working on healthy diet/exercise and wt loss          I reviewed the patient's medications, allergies, medical history, family history, and social history.    Sean Dougherty MD        "

## 2017-12-27 NOTE — NURSING NOTE
"Chief Complaint   Patient presents with     Diabetes     Health Maintenance       Initial BP (!) 160/102 (BP Location: Left arm, Patient Position: Chair, Cuff Size: Adult Regular)  Pulse 86  Temp 97.4  F (36.3  C) (Oral)  Wt 263 lb (119.3 kg)  SpO2 97%  BMI 37.74 kg/m2 Estimated body mass index is 37.74 kg/(m^2) as calculated from the following:    Height as of 3/30/17: 5' 10\" (1.778 m).    Weight as of this encounter: 263 lb (119.3 kg).  Medication Reconciliation: complete   Marisela Milligan CMA      "

## 2018-06-20 ENCOUNTER — TELEPHONE (OUTPATIENT)
Dept: FAMILY MEDICINE | Facility: CLINIC | Age: 48
End: 2018-06-20

## 2018-06-20 DIAGNOSIS — E11.9 TYPE 2 DIABETES MELLITUS WITHOUT COMPLICATION, WITHOUT LONG-TERM CURRENT USE OF INSULIN (H): ICD-10-CM

## 2018-06-20 DIAGNOSIS — E78.5 HYPERLIPIDEMIA LDL GOAL <100: ICD-10-CM

## 2018-06-20 DIAGNOSIS — I10 ESSENTIAL HYPERTENSION WITH GOAL BLOOD PRESSURE LESS THAN 140/90: ICD-10-CM

## 2018-06-20 RX ORDER — CARVEDILOL 25 MG/1
TABLET ORAL
Qty: 60 TABLET | Refills: 0 | Status: SHIPPED | OUTPATIENT
Start: 2018-06-20 | End: 2018-06-20

## 2018-06-20 RX ORDER — AMLODIPINE BESYLATE 10 MG/1
TABLET ORAL
Qty: 30 TABLET | Refills: 0 | Status: SHIPPED | OUTPATIENT
Start: 2018-06-20 | End: 2018-06-20

## 2018-06-20 RX ORDER — LISINOPRIL AND HYDROCHLOROTHIAZIDE 20; 25 MG/1; MG/1
TABLET ORAL
Qty: 60 TABLET | Refills: 0 | Status: SHIPPED | OUTPATIENT
Start: 2018-06-20 | End: 2018-06-20

## 2018-06-20 NOTE — TELEPHONE ENCOUNTER
Medications are being filled for 1 time refill only due to:  Patient needs to be seen because was due for diabetes and BP follow up in Feb 2018, not yet done.     Encounter routed to team to reach out to patient to schedule.    Caterina Jiang RN  [facility]

## 2018-06-20 NOTE — TELEPHONE ENCOUNTER
"Requested Prescriptions   Pending Prescriptions Disp Refills     carvedilol (COREG) 25 MG tablet [Pharmacy Med Name: CARVEDILOL 25MG TABLETS] 180 tablet 0    Last Written Prescription Date:  12-27-17  Last Fill Quantity: 180,  # refills: 1   Last office visit: 12/27/2017 with prescribing provider:     Future Office Visit:     Sig: TAKE 1 TABLET(25 MG) BY MOUTH TWICE DAILY WITH MEALS    Beta-Blockers Protocol Failed    6/20/2018 10:06 AM       Failed - Blood pressure under 140/90 in past 12 months    BP Readings from Last 3 Encounters:   12/27/17 (!) 162/92   03/30/17 138/85   06/24/16 136/86                Passed - Patient is age 6 or older       Passed - Recent (12 mo) or future (30 days) visit within the authorizing provider's specialty    Patient had office visit in the last 12 months or has a visit in the next 30 days with authorizing provider or within the authorizing provider's specialty.  See \"Patient Info\" tab in inbasket, or \"Choose Columns\" in Meds & Orders section of the refill encounter.            amLODIPine (NORVASC) 10 MG tablet [Pharmacy Med Name: AMLODIPINE BESYLATE 10MG TABLETS] 90 tablet 0    Last Written Prescription Date:  12-27-17  Last Fill Quantity: 90,  # refills: 1   Last office visit: 12/27/2017 with prescribing provider:     Future Office Visit:     Sig: TAKE 1 TABLET(10 MG) BY MOUTH DAILY    Calcium Channel Blockers Protocol  Failed    6/20/2018 10:06 AM       Failed - Blood pressure under 140/90 in past 12 months    BP Readings from Last 3 Encounters:   12/27/17 (!) 162/92 03/30/17 138/85   06/24/16 136/86                Failed - Normal serum creatinine on file in past 12 months    Recent Labs   Lab Test  03/30/17   0726   CR  0.70            Passed - Recent (12 mo) or future (30 days) visit within the authorizing provider's specialty    Patient had office visit in the last 12 months or has a visit in the next 30 days with authorizing provider or within the authorizing provider's " "specialty.  See \"Patient Info\" tab in inbasket, or \"Choose Columns\" in Meds & Orders section of the refill encounter.           Passed - Patient is age 18 or older        lisinopril-hydrochlorothiazide (PRINZIDE/ZESTORETIC) 20-25 MG per tablet [Pharmacy Med Name: LISINOPRIL-HCTZ 20/25MG TABLETS] 180 tablet 0    Last Written Prescription Date:  12-27-17  Last Fill Quantity: 180,  # refills: 1   Last office visit: 12/27/2017 with prescribing provider:     Future Office Visit:     Sig: TAKE 2 TABLETS BY MOUTH DAILY    Diuretics (Including Combos) Protocol Failed    6/20/2018 10:06 AM       Failed - Blood pressure under 140/90 in past 12 months    BP Readings from Last 3 Encounters:   12/27/17 (!) 162/92   03/30/17 138/85   06/24/16 136/86                Failed - Normal serum creatinine on file in past 12 months    Recent Labs   Lab Test  03/30/17   0726   CR  0.70             Failed - Normal serum potassium on file in past 12 months    Recent Labs   Lab Test  03/30/17   0726   POTASSIUM  3.5                   Failed - Normal serum sodium on file in past 12 months    Recent Labs   Lab Test  03/30/17   0726   NA  136             Passed - Recent (12 mo) or future (30 days) visit within the authorizing provider's specialty    Patient had office visit in the last 12 months or has a visit in the next 30 days with authorizing provider or within the authorizing provider's specialty.  See \"Patient Info\" tab in inbasket, or \"Choose Columns\" in Meds & Orders section of the refill encounter.           Passed - Patient is age 18 or older          "

## 2018-06-20 NOTE — TELEPHONE ENCOUNTER
"Requested Prescriptions   Pending Prescriptions Disp Refills     metFORMIN (GLUCOPHAGE) 1000 MG tablet [Pharmacy Med Name: METFORMIN 1000MG TABLETS] 180 tablet 0    Last Written Prescription Date:  12-27-17  Last Fill Quantity: 180,  # refills: 1   Last office visit: 12/27/2017 with prescribing provider:     Future Office Visit:     Sig: TAKE 1 TABLET BY MOUTH TWICE DAILY WITH MEALS    Biguanide Agents Failed    6/20/2018 12:38 PM       Failed - Blood pressure less than 140/90 in past 6 months    BP Readings from Last 3 Encounters:   12/27/17 (!) 162/92   03/30/17 138/85   06/24/16 136/86                Failed - Patient has documented LDL within the past 12 mos.    Recent Labs   Lab Test  03/30/17   0726   LDL  84            Failed - Patient has had a Microalbumin in the past 12 mos.    Recent Labs   Lab Test  06/24/16   0716   MICROL  28   UMALCR  31.39*            Failed - Patient has documented A1c within the specified period of time.    If HgbA1C is 8 or greater, it needs to be on file within the past 3 months.  If less than 8, must be on file within the past 6 months.     Recent Labs   Lab Test  03/30/17   0726   A1C  10.7*            Passed - Patient is age 10 or older       Passed - Patient's CR is NOT>1.4 OR Patient's EGFR is NOT<45 within past 12 mos.    Recent Labs   Lab Test  03/30/17   0726   GFRESTIMATED  >90  Non  GFR Calc     GFRESTBLACK  >90   GFR Calc         Recent Labs   Lab Test  03/30/17   0726   CR  0.70            Passed - Patient does NOT have a diagnosis of CHF.       Passed - Recent (6 mo) or future (30 days) visit within the authorizing provider's specialty    Patient had office visit in the last 6 months or has a visit in the next 30 days with authorizing provider or within the authorizing provider's specialty.  See \"Patient Info\" tab in inbasket, or \"Choose Columns\" in Meds & Orders section of the refill encounter.              "

## 2018-06-20 NOTE — TELEPHONE ENCOUNTER
"Requested Prescriptions   Pending Prescriptions Disp Refills     simvastatin (ZOCOR) 20 MG tablet [Pharmacy Med Name: SIMVASTATIN 20MG TABLETS] 90 tablet 0    Last Written Prescription Date:  12/27/17  Last Fill Quantity: 90,  # refills: 1   Last office visit: 12/27/2017 with prescribing provider:     Future Office Visit:     Sig: TAKE 1 TABLET(20 MG) BY MOUTH AT BEDTIME    Statins Protocol Failed    6/20/2018 11:47 AM       Failed - LDL on file in past 12 months    Recent Labs   Lab Test  03/30/17   0726   LDL  84            Passed - No abnormal creatine kinase in past 12 months    No lab results found.            Passed - Recent (12 mo) or future (30 days) visit within the authorizing provider's specialty    Patient had office visit in the last 12 months or has a visit in the next 30 days with authorizing provider or within the authorizing provider's specialty.  See \"Patient Info\" tab in inbasket, or \"Choose Columns\" in Meds & Orders section of the refill encounter.           Passed - Patient is age 18 or older        glipiZIDE (GLUCOTROL) 10 MG tablet [Pharmacy Med Name: GLIPIZIDE 10MG TABLETS] 180 tablet 0    Last Written Prescription Date:  12/27/17  Last Fill Quantity: 180,  # refills: 1   Last office visit: 12/27/2017 with prescribing provider:     Future Office Visit:     Sig: TAKE 1 TABLET(10 MG) BY MOUTH TWICE DAILY BEFORE MEALS    Sulfonylurea Agents Failed    6/20/2018 11:47 AM       Failed - Blood pressure less than 140/90 in past 6 months    BP Readings from Last 3 Encounters:   12/27/17 (!) 162/92   03/30/17 138/85   06/24/16 136/86                Failed - Patient has documented LDL within the past 12 mos.    Recent Labs   Lab Test  03/30/17   0726   LDL  84            Failed - Patient has had a Microalbumin in the past 12 mos.    Recent Labs   Lab Test  06/24/16   0716   MICROL  28   UMALCR  31.39*            Failed - Patient has documented A1c within the specified period of time.    If HgbA1C is 8 " "or greater, it needs to be on file within the past 3 months.  If less than 8, must be on file within the past 6 months.     Recent Labs   Lab Test  03/30/17   0726   A1C  10.7*            Failed - Patient has a recent creatinine (normal) within the past 12 mos.    Recent Labs   Lab Test  03/30/17   0726   CR  0.70            Passed - Patient is age 18 or older       Passed - Recent (6 mo) or future (30 days) visit within the authorizing provider's specialty    Patient had office visit in the last 6 months or has a visit in the next 30 days with authorizing provider or within the authorizing provider's specialty.  See \"Patient Info\" tab in inbasket, or \"Choose Columns\" in Meds & Orders section of the refill encounter.              "

## 2018-06-21 ENCOUNTER — TELEPHONE (OUTPATIENT)
Dept: FAMILY MEDICINE | Facility: CLINIC | Age: 48
End: 2018-06-21

## 2018-06-21 DIAGNOSIS — E11.9 TYPE 2 DIABETES MELLITUS WITHOUT COMPLICATION, WITHOUT LONG-TERM CURRENT USE OF INSULIN (H): ICD-10-CM

## 2018-06-21 DIAGNOSIS — E78.5 HYPERLIPIDEMIA LDL GOAL <100: ICD-10-CM

## 2018-06-21 RX ORDER — SIMVASTATIN 20 MG
TABLET ORAL
Qty: 90 TABLET | Refills: 0 | Status: SHIPPED | OUTPATIENT
Start: 2018-06-21 | End: 2018-08-13

## 2018-06-21 RX ORDER — GLIPIZIDE 10 MG/1
TABLET ORAL
Qty: 180 TABLET | Refills: 0 | Status: SHIPPED | OUTPATIENT
Start: 2018-06-21 | End: 2018-08-13

## 2018-06-21 RX ORDER — GLIPIZIDE 10 MG/1
TABLET ORAL
Qty: 60 TABLET | Refills: 0 | Status: SHIPPED | OUTPATIENT
Start: 2018-06-21 | End: 2018-06-21

## 2018-06-21 RX ORDER — SIMVASTATIN 20 MG
TABLET ORAL
Qty: 30 TABLET | Refills: 0 | Status: SHIPPED | OUTPATIENT
Start: 2018-06-21 | End: 2018-06-21

## 2018-06-21 NOTE — TELEPHONE ENCOUNTER
Did 90 day supply but patient still needs to be seen    Please inform patient    Sean Dougherty MD

## 2018-06-21 NOTE — TELEPHONE ENCOUNTER
Routing refill request to provider for review/approval because:  Labs not current.  Failed BP.  Gege Benitez RN C[facility] Triage.

## 2018-06-21 NOTE — TELEPHONE ENCOUNTER
Left detailed message with wife, informing that patient needs to be seen.  Patient will be back in town next week, so will call to schedule.

## 2018-06-21 NOTE — TELEPHONE ENCOUNTER
Routing refill request to provider for review/approval because:  Failed protocol.  Gege Benitez RN [facility] Triage.

## 2018-06-21 NOTE — TELEPHONE ENCOUNTER
"Requested Prescriptions   Pending Prescriptions Disp Refills     glipiZIDE (GLUCOTROL) 10 MG tablet [Pharmacy Med Name: GLIPIZIDE 10MG TABLETS] 180 tablet 0    Last Written Prescription Date:  6-21-18  Last Fill Quantity: 60,  # refills: 0   Last office visit: 12/27/2017 with prescribing provider:     Future Office Visit:     Sig: TAKE 1 TABLET BY MOUTH TWICE DAILY BEFORE MEALS    Sulfonylurea Agents Failed    6/21/2018  9:27 AM       Failed - Blood pressure less than 140/90 in past 6 months    BP Readings from Last 3 Encounters:   12/27/17 (!) 162/92   03/30/17 138/85   06/24/16 136/86                Failed - Patient has documented LDL within the past 12 mos.    Recent Labs   Lab Test  03/30/17   0726   LDL  84            Failed - Patient has had a Microalbumin in the past 12 mos.    Recent Labs   Lab Test  06/24/16   0716   MICROL  28   UMALCR  31.39*            Failed - Patient has documented A1c within the specified period of time.    If HgbA1C is 8 or greater, it needs to be on file within the past 3 months.  If less than 8, must be on file within the past 6 months.     Recent Labs   Lab Test  03/30/17   0726   A1C  10.7*            Failed - Patient has a recent creatinine (normal) within the past 12 mos.    Recent Labs   Lab Test  03/30/17   0726   CR  0.70            Passed - Patient is age 18 or older       Passed - Recent (6 mo) or future (30 days) visit within the authorizing provider's specialty    Patient had office visit in the last 6 months or has a visit in the next 30 days with authorizing provider or within the authorizing provider's specialty.  See \"Patient Info\" tab in inbasket, or \"Choose Columns\" in Meds & Orders section of the refill encounter.            simvastatin (ZOCOR) 20 MG tablet [Pharmacy Med Name: SIMVASTATIN 20MG TABLETS] 90 tablet 0    Last Written Prescription Date:  6-21-18  Last Fill Quantity: 30,  # refills: 0   Last office visit: 12/27/2017 with prescribing provider:     Future " "Office Visit:     Sig: TAKE 1 TABLET(20 MG) BY MOUTH AT BEDTIME    Statins Protocol Failed    6/21/2018  9:27 AM       Failed - LDL on file in past 12 months    Recent Labs   Lab Test  03/30/17   0726   LDL  84            Passed - No abnormal creatine kinase in past 12 months    No lab results found.            Passed - Recent (12 mo) or future (30 days) visit within the authorizing provider's specialty    Patient had office visit in the last 12 months or has a visit in the next 30 days with authorizing provider or within the authorizing provider's specialty.  See \"Patient Info\" tab in inbasket, or \"Choose Columns\" in Meds & Orders section of the refill encounter.           Passed - Patient is age 18 or older          "

## 2018-06-22 RX ORDER — CARVEDILOL 25 MG/1
TABLET ORAL
Qty: 180 TABLET | Refills: 0 | Status: SHIPPED | OUTPATIENT
Start: 2018-06-22 | End: 2018-08-13

## 2018-06-22 RX ORDER — LISINOPRIL AND HYDROCHLOROTHIAZIDE 20; 25 MG/1; MG/1
TABLET ORAL
Qty: 180 TABLET | Refills: 0 | Status: SHIPPED | OUTPATIENT
Start: 2018-06-22 | End: 2018-08-13

## 2018-06-22 RX ORDER — AMLODIPINE BESYLATE 10 MG/1
TABLET ORAL
Qty: 90 TABLET | Refills: 0 | Status: SHIPPED | OUTPATIENT
Start: 2018-06-22 | End: 2018-08-13

## 2018-06-22 NOTE — TELEPHONE ENCOUNTER
Routing refill request to provider for review/approval because:  Failed protocol below.  Gege Benitez RN C[facility] Triage.

## 2018-06-22 NOTE — TELEPHONE ENCOUNTER
"Requested Prescriptions   Pending Prescriptions Disp Refills     amLODIPine (NORVASC) 10 MG tablet [Pharmacy Med Name: AMLODIPINE BESYLATE 10MG TABLETS] 90 tablet 0    Last Written Prescription Date:  6-20-18  Last Fill Quantity: 30,  # refills: 0   Last office visit: 12/27/2017 with prescribing provider:     Future Office Visit:     Sig: TAKE 1 TABLET BY MOUTH DAILY.    Calcium Channel Blockers Protocol  Failed    6/20/2018  4:09 PM       Failed - Blood pressure under 140/90 in past 12 months    BP Readings from Last 3 Encounters:   12/27/17 (!) 162/92   03/30/17 138/85   06/24/16 136/86                Failed - Normal serum creatinine on file in past 12 months    Recent Labs   Lab Test  03/30/17   0726   CR  0.70            Passed - Recent (12 mo) or future (30 days) visit within the authorizing provider's specialty    Patient had office visit in the last 12 months or has a visit in the next 30 days with authorizing provider or within the authorizing provider's specialty.  See \"Patient Info\" tab in inbasket, or \"Choose Columns\" in Meds & Orders section of the refill encounter.           Passed - Patient is age 18 or older        lisinopril-hydrochlorothiazide (PRINZIDE/ZESTORETIC) 20-25 MG per tablet [Pharmacy Med Name: LISINOPRIL-HCTZ 20/25MG TABLETS] 180 tablet 0    Last Written Prescription Date:  6-20-18  Last Fill Quantity: 60,  # refills: 0   Last office visit: 12/27/2017 with prescribing provider:     Future Office Visit:     Sig: TAKE 2 TABLETS BY MOUTH DAILY.    Diuretics (Including Combos) Protocol Failed    6/20/2018  4:09 PM       Failed - Blood pressure under 140/90 in past 12 months    BP Readings from Last 3 Encounters:   12/27/17 (!) 162/92   03/30/17 138/85   06/24/16 136/86                Failed - Normal serum creatinine on file in past 12 months    Recent Labs   Lab Test  03/30/17   0726   CR  0.70             Failed - Normal serum potassium on file in past 12 months    Recent Labs   Lab Test  " "03/30/17   0726   POTASSIUM  3.5                   Failed - Normal serum sodium on file in past 12 months    Recent Labs   Lab Test  03/30/17   0726   NA  136             Passed - Recent (12 mo) or future (30 days) visit within the authorizing provider's specialty    Patient had office visit in the last 12 months or has a visit in the next 30 days with authorizing provider or within the authorizing provider's specialty.  See \"Patient Info\" tab in inbasket, or \"Choose Columns\" in Meds & Orders section of the refill encounter.           Passed - Patient is age 18 or older        carvedilol (COREG) 25 MG tablet [Pharmacy Med Name: CARVEDILOL 25MG TABLETS] 180 tablet 0    Last Written Prescription Date:  6-20-18  Last Fill Quantity: 60,  # refills: 0   Last office visit: 12/27/2017 with prescribing provider:     Future Office Visit:     Sig: TAKE 1 TABLET BY MOUTH TWICE DAILY WITH MEALS.    Beta-Blockers Protocol Failed    6/20/2018  4:09 PM       Failed - Blood pressure under 140/90 in past 12 months    BP Readings from Last 3 Encounters:   12/27/17 (!) 162/92   03/30/17 138/85   06/24/16 136/86                Passed - Patient is age 6 or older       Passed - Recent (12 mo) or future (30 days) visit within the authorizing provider's specialty    Patient had office visit in the last 12 months or has a visit in the next 30 days with authorizing provider or within the authorizing provider's specialty.  See \"Patient Info\" tab in inbasket, or \"Choose Columns\" in Meds & Orders section of the refill encounter.              "

## 2018-08-13 ENCOUNTER — TRANSFERRED RECORDS (OUTPATIENT)
Dept: HEALTH INFORMATION MANAGEMENT | Facility: CLINIC | Age: 48
End: 2018-08-13

## 2018-08-13 ENCOUNTER — OFFICE VISIT (OUTPATIENT)
Dept: FAMILY MEDICINE | Facility: CLINIC | Age: 48
End: 2018-08-13
Payer: COMMERCIAL

## 2018-08-13 VITALS
HEART RATE: 77 BPM | HEIGHT: 70 IN | SYSTOLIC BLOOD PRESSURE: 165 MMHG | BODY MASS INDEX: 39.08 KG/M2 | WEIGHT: 273 LBS | DIASTOLIC BLOOD PRESSURE: 90 MMHG | TEMPERATURE: 98.7 F

## 2018-08-13 DIAGNOSIS — Z12.5 SCREENING FOR PROSTATE CANCER: ICD-10-CM

## 2018-08-13 DIAGNOSIS — E66.9 OBESITY, UNSPECIFIED OBESITY SEVERITY, UNSPECIFIED OBESITY TYPE: ICD-10-CM

## 2018-08-13 DIAGNOSIS — E11.9 TYPE 2 DIABETES MELLITUS WITHOUT COMPLICATION, WITHOUT LONG-TERM CURRENT USE OF INSULIN (H): Primary | ICD-10-CM

## 2018-08-13 DIAGNOSIS — I10 ESSENTIAL HYPERTENSION WITH GOAL BLOOD PRESSURE LESS THAN 140/90: ICD-10-CM

## 2018-08-13 DIAGNOSIS — E78.5 HYPERLIPIDEMIA LDL GOAL <100: ICD-10-CM

## 2018-08-13 DIAGNOSIS — R53.83 FATIGUE, UNSPECIFIED TYPE: ICD-10-CM

## 2018-08-13 LAB
ALBUMIN SERPL-MCNC: 3.7 G/DL (ref 3.4–5)
ALP SERPL-CCNC: 66 U/L (ref 40–150)
ALT SERPL W P-5'-P-CCNC: 29 U/L (ref 0–70)
ANION GAP SERPL CALCULATED.3IONS-SCNC: 11 MMOL/L (ref 3–14)
AST SERPL W P-5'-P-CCNC: 13 U/L (ref 0–45)
BASOPHILS # BLD AUTO: 0 10E9/L (ref 0–0.2)
BASOPHILS NFR BLD AUTO: 0.7 %
BILIRUB SERPL-MCNC: 0.4 MG/DL (ref 0.2–1.3)
BUN SERPL-MCNC: 9 MG/DL (ref 7–30)
CALCIUM SERPL-MCNC: 8.3 MG/DL (ref 8.5–10.1)
CHLORIDE SERPL-SCNC: 102 MMOL/L (ref 94–109)
CHOLEST SERPL-MCNC: 168 MG/DL
CO2 SERPL-SCNC: 27 MMOL/L (ref 20–32)
CREAT SERPL-MCNC: 0.69 MG/DL (ref 0.66–1.25)
CREAT UR-MCNC: 103 MG/DL
DIFFERENTIAL METHOD BLD: NORMAL
EOSINOPHIL # BLD AUTO: 0.2 10E9/L (ref 0–0.7)
EOSINOPHIL NFR BLD AUTO: 2.5 %
ERYTHROCYTE [DISTWIDTH] IN BLOOD BY AUTOMATED COUNT: 13.1 % (ref 10–15)
GFR SERPL CREATININE-BSD FRML MDRD: >90 ML/MIN/1.7M2
GLUCOSE SERPL-MCNC: 190 MG/DL (ref 70–99)
HBA1C MFR BLD: 10.3 % (ref 0–5.6)
HCT VFR BLD AUTO: 43.6 % (ref 40–53)
HDLC SERPL-MCNC: 43 MG/DL
HGB BLD-MCNC: 15.2 G/DL (ref 13.3–17.7)
LDLC SERPL CALC-MCNC: 73 MG/DL
LYMPHOCYTES # BLD AUTO: 1.4 10E9/L (ref 0.8–5.3)
LYMPHOCYTES NFR BLD AUTO: 23 %
MCH RBC QN AUTO: 29.7 PG (ref 26.5–33)
MCHC RBC AUTO-ENTMCNC: 34.9 G/DL (ref 31.5–36.5)
MCV RBC AUTO: 85 FL (ref 78–100)
MICROALBUMIN UR-MCNC: 384 MG/L
MICROALBUMIN/CREAT UR: 372.82 MG/G CR (ref 0–17)
MONOCYTES # BLD AUTO: 0.6 10E9/L (ref 0–1.3)
MONOCYTES NFR BLD AUTO: 9.2 %
NEUTROPHILS # BLD AUTO: 4 10E9/L (ref 1.6–8.3)
NEUTROPHILS NFR BLD AUTO: 64.6 %
NONHDLC SERPL-MCNC: 125 MG/DL
PLATELET # BLD AUTO: 182 10E9/L (ref 150–450)
POTASSIUM SERPL-SCNC: 3.6 MMOL/L (ref 3.4–5.3)
PROT SERPL-MCNC: 7.9 G/DL (ref 6.8–8.8)
PSA SERPL-ACNC: 0.37 UG/L (ref 0–4)
RBC # BLD AUTO: 5.12 10E12/L (ref 4.4–5.9)
SODIUM SERPL-SCNC: 140 MMOL/L (ref 133–144)
TRIGL SERPL-MCNC: 261 MG/DL
TSH SERPL DL<=0.005 MIU/L-ACNC: 1.48 MU/L (ref 0.4–4)
WBC # BLD AUTO: 6.1 10E9/L (ref 4–11)

## 2018-08-13 PROCEDURE — 85025 COMPLETE CBC W/AUTO DIFF WBC: CPT | Performed by: FAMILY MEDICINE

## 2018-08-13 PROCEDURE — 36415 COLL VENOUS BLD VENIPUNCTURE: CPT | Performed by: FAMILY MEDICINE

## 2018-08-13 PROCEDURE — 83036 HEMOGLOBIN GLYCOSYLATED A1C: CPT | Performed by: FAMILY MEDICINE

## 2018-08-13 PROCEDURE — 80061 LIPID PANEL: CPT | Performed by: FAMILY MEDICINE

## 2018-08-13 PROCEDURE — 80053 COMPREHEN METABOLIC PANEL: CPT | Performed by: FAMILY MEDICINE

## 2018-08-13 PROCEDURE — G0103 PSA SCREENING: HCPCS | Performed by: FAMILY MEDICINE

## 2018-08-13 PROCEDURE — 99207 C FOOT EXAM  NO CHARGE: CPT | Mod: 25 | Performed by: FAMILY MEDICINE

## 2018-08-13 PROCEDURE — 84443 ASSAY THYROID STIM HORMONE: CPT | Performed by: FAMILY MEDICINE

## 2018-08-13 PROCEDURE — 82043 UR ALBUMIN QUANTITATIVE: CPT | Performed by: FAMILY MEDICINE

## 2018-08-13 PROCEDURE — 99214 OFFICE O/P EST MOD 30 MIN: CPT | Performed by: FAMILY MEDICINE

## 2018-08-13 RX ORDER — LISINOPRIL AND HYDROCHLOROTHIAZIDE 20; 25 MG/1; MG/1
TABLET ORAL
Qty: 180 TABLET | Refills: 3 | Status: SHIPPED | OUTPATIENT
Start: 2018-08-13 | End: 2020-01-13

## 2018-08-13 RX ORDER — CARVEDILOL 25 MG/1
TABLET ORAL
Qty: 180 TABLET | Refills: 3 | Status: SHIPPED | OUTPATIENT
Start: 2018-08-13 | End: 2019-08-24

## 2018-08-13 RX ORDER — AMLODIPINE BESYLATE 10 MG/1
TABLET ORAL
Qty: 90 TABLET | Refills: 3 | Status: SHIPPED | OUTPATIENT
Start: 2018-08-13 | End: 2019-08-24

## 2018-08-13 RX ORDER — SIMVASTATIN 20 MG
TABLET ORAL
Qty: 90 TABLET | Refills: 3 | Status: SHIPPED | OUTPATIENT
Start: 2018-08-13 | End: 2019-08-24

## 2018-08-13 RX ORDER — LIRAGLUTIDE 6 MG/ML
1.8 INJECTION SUBCUTANEOUS DAILY
Qty: 9 ML | Refills: 2 | Status: SHIPPED | OUTPATIENT
Start: 2018-08-13 | End: 2020-01-13

## 2018-08-13 RX ORDER — GLIPIZIDE 10 MG/1
TABLET ORAL
Qty: 180 TABLET | Refills: 1 | Status: SHIPPED | OUTPATIENT
Start: 2018-08-13 | End: 2020-01-13

## 2018-08-13 ASSESSMENT — PAIN SCALES - GENERAL: PAINLEVEL: NO PAIN (0)

## 2018-08-13 NOTE — MR AVS SNAPSHOT
After Visit Summary   8/13/2018    Davie Banuelos    MRN: [id number]           Patient Information     Date Of Birth          1970        Visit Information        Provider Department      8/13/2018 7:20 AM Sean Dougherty MD [facility]        Today's Diagnoses     Type 2 diabetes mellitus without complication, without long-term current use of insulin (H)    -  1    Screening for prostate cancer        Hyperlipidemia LDL goal <100        Fatigue, unspecified type        Essential hypertension with goal blood pressure less than 140/90          Care Instructions    Plan on adding the victoza once daily injection ( not insulin )    Diabetes educator, meet with them    Return to clinic in late September    Keep working on healthy diet/exercise and wt loss    Low salt diet              Follow-ups after your visit        Additional Services     DIABETES EDUCATOR REFERRAL       DIABETES SELF MANAGEMENT TRAINING (DSMT)      Your provider has referred you to Diabetes Education: FM[facility]: Diabetes Education - [facility] [phone]   [email or web address]     If an urgent visit is needed or A1C is above 12, Care Team to call the Diabetes  Education Team at [phone] or send an In Basket message to the Diabetes Education Pool (P DIAB ED-PATIENT CARE).    A  will call you to make your appointment. If it has been more than 3 business days since your referral was placed, please call the above phone number to schedule.    Type of training and number of hours: Previous Diagnosis: Follow-up DSMT - 2 hours.    Diabetes Type: Type 2 - On Oral Medication   Medicare covers: 10 hours of initial DSMT in 12 month period from the time of first visit, plus 2 hours of follow-up DSMT annually, and additional hours as requested for insulin training.         Diabetes Co-Morbidities: atherosclerotic cardiovascular disease                A1C Goal:  <8.0       A1C is: Lab Results       Component                Value               Date                       A1C                      10.7                03/30/2017              MEDICAL NUTRITION THERAPY (MNT) for Diabetes    Medical Nutrition Therapy with a Registered Dietitian can be provided in coordination with Diabetes Self-Management Training to assist in achieving optimal diabetes management.    MNT Type and Hours: Previous diagnosis: Annual follow-up MNT - 2 hours                       Medicare will cover: 3 hours initial MNT in 12 month period after first visit, plus 2 hours of follow-up MNT annually        Diabetes Education Topics: Comprehensive Knowledge Assessment and Instruction    Special Educational Needs Requiring Individual DSMT: None      Please be aware that coverage of these services is subject to the terms and limitations of your health insurance plan.  Call member services at your health plan to determine Diabetes Self-Management Training (Codes  and ) and Medical Nutrition Therapy (Codes 37521 and 20567) benefits and ask which blood glucose monitor brands are covered by your plan.  Please bring the following with you to your appointment:    (1)  List of current medications   (2)  List of Blood Glucose Monitor brands that are covered by your insurance plan  (3)  Blood Glucose Monitor and log book  (4)   Food records for the 3 days prior to your visit    The Certified Diabetes Educator may make diabetes medication adjustments per the CDE Protocol and Collaborative Practice Agreement.        PATIENT LIKELY NEEDS ADDITIONAL MEDICATION.  NEW PRESCRIPTION FOR VICTOZA GIVEN.  PLEASE HELP WITH THIS AND OVERALL DIABETES.                  Who to contact     If you have questions or need follow up information about today's clinic visit or your schedule please contact [facility] directly at [phone].  Normal or non-critical lab and imaging results will  "be communicated to you by MyChart, letter or phone within 4 business days after the clinic has received the results. If you do not hear from us within 7 days, please contact the clinic through MyChart or phone. If you have a critical or abnormal lab result, we will notify you by phone as soon as possible.  Submit refill requests through aCon or call your pharmacy and they will forward the refill request to us. Please allow 3 business days for your refill to be completed.          Additional Information About Your Visit        Care EveryWhere ID     This is your Care EveryWhere ID. This could be used by other organizations to access your [facility] medical records  [id number]        Your Vitals Were     Pulse Temperature Height BMI (Body Mass Index)          77 98.7  F (37.1  C) (Oral) 5' 10\" (1.778 m) 39.17 kg/m2         Blood Pressure from Last 3 Encounters:   08/13/18 165/90   12/27/17 (!) 162/92   03/30/17 138/85    Weight from Last 3 Encounters:   08/13/18 273 lb (123.8 kg)   12/27/17 263 lb (119.3 kg)   03/30/17 270 lb (122.5 kg)              We Performed the Following     Albumin Random Urine Quantitative with Creat Ratio     CBC with platelets differential     Comprehensive metabolic panel     DIABETES EDUCATOR REFERRAL     FOOT EXAM     Hemoglobin A1c     Lipid panel reflex to direct LDL Fasting     Prostate spec antigen screen     TSH with free T4 reflex          Today's Medication Changes          These changes are accurate as of 8/13/18  7:48 AM.  If you have any questions, ask your nurse or doctor.               Start taking these medicines.        Dose/Directions    liraglutide 18 MG/3ML soln   Commonly known as:  VICTOZA   Used for:  Type 2 diabetes mellitus without complication, without long-term current use of insulin (H)   Started by:  Sean Dougherty MD        Dose:  1.8 mg   Inject 1.8 mg Subcutaneous daily   Quantity:  9 mL   Refills:  2            Where to get your medicines      These " medications were sent to [id number] - [place], [place] - [address] [place] [address] AT [facility] [address] [place] [address] [place]  [address] [place] [address], [place] [address]     Phone:  [phone]     amLODIPine 10 MG tablet    carvedilol 25 MG tablet    glipiZIDE 10 MG tablet    liraglutide 18 MG/3ML soln    lisinopril-hydrochlorothiazide 20-25 MG per tablet    metFORMIN 1000 MG tablet    simvastatin 20 MG tablet                Primary Care Provider Office Phone # Fax #    Sean Dougherty [phone]       [address] [facility] [address]        Equal Access to Services     MAYE FUNG : Hadii dodie beach hadopalo Sojose, waaxda luqadaha, qaybta kaalmada adeegyada, alonzo bautistain haykevin teresa . So [facility] [phone].    ATENCIÓN: Si habla español, tiene a garcía disposición servicios gratuitos de asistencia lingüística. [facility] [phone].    We comply with applicable federal civil rights laws and [place] laws. We do not discriminate on the basis of race, color, national origin, age, disability, sex, sexual orientation, or gender identity.            Thank you!     Thank you for choosing [facility]  for your care. Our goal is always to provide you with excellent care. Hearing back from our patients is one way we can continue to improve our services. Please take a few minutes to complete the written survey that you may receive in the mail after your visit with us. Thank you!             Your Updated Medication List - Protect others around you: Learn how to safely use, store and throw away your medicines at www.disposemymeds.org.          This list is accurate as of 8/13/18  7:48 AM.  Always use your most recent med list.                   Brand Name Dispense Instructions for use Diagnosis    ACCU-CHEK SOFTCLIX LANCETS      Test 2 times daily        amLODIPine 10 MG tablet    NORVASC    90 tablet    TAKE 1 TABLET BY MOUTH DAILY.     Essential hypertension with goal blood pressure less than 140/90       aspirin 81 MG tablet      Take 1 tablet by mouth daily.        blood glucose monitoring test strip    ACCU-CHEK SWETA    100 each    Test twice daily (labile sugars)    Type 2 diabetes mellitus without complication (H)       carvedilol 25 MG tablet    COREG    180 tablet    TAKE 1 TABLET BY MOUTH TWICE DAILY WITH MEALS.    Essential hypertension with goal blood pressure less than 140/90       glipiZIDE 10 MG tablet    GLUCOTROL    180 tablet    TAKE 1 TABLET BY MOUTH TWICE DAILY BEFORE MEALS    Type 2 diabetes mellitus without complication, without long-term current use of insulin (H)       Krill Oil Caps      Take  by mouth daily.        liraglutide 18 MG/3ML soln    VICTOZA    9 mL    Inject 1.8 mg Subcutaneous daily    Type 2 diabetes mellitus without complication, without long-term current use of insulin (H)       lisinopril-hydrochlorothiazide 20-25 MG per tablet    PRINZIDE/ZESTORETIC    180 tablet    TAKE 2 TABLETS BY MOUTH DAILY.    Essential hypertension with goal blood pressure less than 140/90       metFORMIN 1000 MG tablet    GLUCOPHAGE    180 tablet    TAKE 1 TABLET BY MOUTH TWICE DAILY WITH MEALS    Type 2 diabetes mellitus without complication, without long-term current use of insulin (H)       simvastatin 20 MG tablet    ZOCOR    90 tablet    TAKE 1 TABLET(20 MG) BY MOUTH AT BEDTIME    Hyperlipidemia LDL goal <100

## 2018-08-13 NOTE — LETTER
[facility]   [address] [place]  [address]  [phone]                                   August 15, 2018    Davie Banuelos  [address]  [place] [address]        Dear Davie,    Lots of protein in the urine this time, but the blood test for kidney function ( creatinine ) is still normal.     Overall diabetes test ( hemoglobin a1c ) is high as expected.     Triglycerides are high.     Other labs are okay    Results for orders placed or performed in visit on 08/13/18   Albumin Random Urine Quantitative with Creat Ratio   Result Value Ref Range    Creatinine Urine 103 mg/dL    Albumin Urine mg/L 384 mg/L    Albumin Urine mg/g Cr 372.82 (H) 0 - 17 mg/g Cr   Hemoglobin A1c   Result Value Ref Range    Hemoglobin A1C 10.3 (H) 0 - 5.6 %   Comprehensive metabolic panel   Result Value Ref Range    Sodium 140 133 - 144 mmol/L    Potassium 3.6 3.4 - 5.3 mmol/L    Chloride 102 94 - 109 mmol/L    Carbon Dioxide 27 20 - 32 mmol/L    Anion Gap 11 3 - 14 mmol/L    Glucose 190 (H) 70 - 99 mg/dL    Urea Nitrogen 9 7 - 30 mg/dL    Creatinine 0.69 0.66 - 1.25 mg/dL    GFR Estimate >90 >60 mL/min/1.7m2    GFR Estimate If Black >90 >60 mL/min/1.7m2    Calcium 8.3 (L) 8.5 - 10.1 mg/dL    Bilirubin Total 0.4 0.2 - 1.3 mg/dL    Albumin 3.7 3.4 - 5.0 g/dL    Protein Total 7.9 6.8 - 8.8 g/dL    Alkaline Phosphatase 66 40 - 150 U/L    ALT 29 0 - 70 U/L    AST 13 0 - 45 U/L   Lipid panel reflex to direct LDL Fasting   Result Value Ref Range    Cholesterol 168 <200 mg/dL    Triglycerides 261 (H) <150 mg/dL    HDL Cholesterol 43 >39 mg/dL    LDL Cholesterol Calculated 73 <100 mg/dL    Non HDL Cholesterol 125 <130 mg/dL   TSH with free T4 reflex   Result Value Ref Range    TSH 1.48 0.40 - 4.00 mU/L   CBC with platelets differential   Result Value Ref Range    WBC 6.1 4.0 - 11.0 10e9/L    RBC Count 5.12 4.4 - 5.9 10e12/L    Hemoglobin 15.2 13.3 - 17.7 g/dL    Hematocrit 43.6 40.0 - 53.0 %     MCV 85 78 - 100 fl    MCH 29.7 26.5 - 33.0 pg    MCHC 34.9 31.5 - 36.5 g/dL    RDW 13.1 10.0 - 15.0 %    Platelet Count 182 150 - 450 10e9/L    Diff Method Automated Method     % Neutrophils 64.6 %    % Lymphocytes 23.0 %    % Monocytes 9.2 %    % Eosinophils 2.5 %    % Basophils 0.7 %    Absolute Neutrophil 4.0 1.6 - 8.3 10e9/L    Absolute Lymphocytes 1.4 0.8 - 5.3 10e9/L    Absolute Monocytes 0.6 0.0 - 1.3 10e9/L    Absolute Eosinophils 0.2 0.0 - 0.7 10e9/L    Absolute Basophils 0.0 0.0 - 0.2 10e9/L   Prostate spec antigen screen   Result Value Ref Range    PSA 0.37 0 - 4 ug/L       If you have any questions please call the clinic at [phone]    Sincerely,    Sean Dougherty MD  l

## 2018-08-13 NOTE — PROGRESS NOTES
SUBJECTIVE:   Davie Banuelos is a 47 year old male who presents to clinic today for the following health issues:       Diabetes Follow-up    Patient is checking blood sugars: once daily.  Results are as follows:         am - 150-240's    Diabetic concerns: blood sugar frequently over 200     Symptoms of hypoglycemia (low blood sugar): none     Paresthesias (numbness or burning in feet) or sores: No     Date of last diabetic eye exam: 08/13/2018 at [facility]     BP Readings from Last 2 Encounters:   12/27/17 (!) 162/92   03/30/17 138/85     Hemoglobin A1C (%)   Date Value   03/30/2017 10.7 (H)   06/24/2016 9.8 (H)     LDL Cholesterol Calculated (mg/dL)   Date Value   03/30/2017 84   06/24/2016 87       Diabetes Management Resources    Amount of exercise or physical activity: 4-5 days/week for an average of 30-45 minutes    Problems taking medications regularly: No    Medication side effects: none    Diet: low salt and low fat/cholesterol        none    Problem list and histories reviewed & adjusted, as indicated.  Additional history: as documented         Reviewed and updated as needed this visit by clinical staff  Tobacco  Allergies  Meds  Med Hx  Surg Hx  Fam Hx  Soc Hx      Reviewed and updated as needed this visit by Provider          blood pressure at home mostly 140s and 150s systolic, occasional 130s or 160s or 170    Diastolic mostly 80s, some 90s     Sugars mostly 200s  Some in 100s    Rarely 300s    Max 411 on the road     No symptoms high or low sugars      Walking is main exercise    More tired than used to be only 3-5 hours of sleep daily    A little more on weekend    Diet good at home, bad on the road    More time on road than home      Physical Exam   Constitutional: He is oriented to person, place, and time and well-developed, well-nourished, and in no distress. No distress.   HENT:   Head: Normocephalic and atraumatic.   Eyes: Conjunctivae are normal.   Neck: Carotid bruit is not present.    Cardiovascular: Normal rate, regular rhythm, normal heart sounds and intact distal pulses.  Exam reveals no gallop and no friction rub.    No murmur heard.  Pulmonary/Chest: Effort normal and breath sounds normal. No respiratory distress. He has no wheezes. He has no rales.   Musculoskeletal: He exhibits no edema.   Neurological: He is alert and oriented to person, place, and time.   Skin: He is not diaphoretic.   Psychiatric: Mood and affect normal.       Foot exam bilat:  Normal        ASSESSMENT / PLAN:  (E11.9) Type 2 diabetes mellitus without complication, without long-term current use of insulin (H)  (primary encounter diagnosis)  Comment: hemoglobin a1c likely to be again and maxed out basically on the glipizide and metformin. Patient wants to avoid insulin.  Plan on starting vicotoza type med.  Could see diab ed also.  See us in a couple months.   Plan: Albumin Random Urine Quantitative with Creat         Ratio, FOOT EXAM, Hemoglobin A1c, glipiZIDE         (GLUCOTROL) 10 MG tablet, metFORMIN         (GLUCOPHAGE) 1000 MG tablet, liraglutide         (VICTOZA) 18 MG/3ML soln, DIABETES EDUCATOR         REFERRAL             (Z12.5) Screening for prostate cancer  Comment: psa   Plan: Prostate spec antigen screen             (E78.5) Hyperlipidemia LDL goal <100  Comment: recheck labs, refill med  Plan: Comprehensive metabolic panel, Lipid panel         reflex to direct LDL Fasting, simvastatin         (ZOCOR) 20 MG tablet             (R53.83) Fatigue, unspecified type  Comment: check   Plan: TSH with free T4 reflex, CBC with platelets         differential             (I10) Essential hypertension with goal blood pressure less than 140/90  Comment: high but already on good doses of 4 meds ( incl one combo pill )  Plan: amLODIPine (NORVASC) 10 MG tablet, carvedilol         (COREG) 25 MG tablet,         lisinopril-hydrochlorothiazide         (PRINZIDE/ZESTORETIC) 20-25 MG per tablet        Refill meds     (E66.9)  Obesity, unspecified obesity severity, unspecified obesity type  Comment: chronic   Plan: keep working on healthy diet/exercise and wt loss         I reviewed the patient's medications, allergies, medical history, family history, and social history.    Sean Dougherty MD

## 2018-08-13 NOTE — PATIENT INSTRUCTIONS
Plan on adding the victoza once daily injection ( not insulin )    Diabetes educator, meet with them    Return to clinic in late September    Keep working on healthy diet/exercise and wt loss    Low salt diet

## 2018-08-14 ENCOUNTER — TELEPHONE (OUTPATIENT)
Dept: FAMILY MEDICINE | Facility: CLINIC | Age: 48
End: 2018-08-14

## 2018-08-14 DIAGNOSIS — E11.9 TYPE 2 DIABETES MELLITUS WITHOUT COMPLICATION, WITHOUT LONG-TERM CURRENT USE OF INSULIN (H): Primary | ICD-10-CM

## 2018-08-14 RX ORDER — PEN NEEDLE, DIABETIC 30 GX5/16"
1 NEEDLE, DISPOSABLE MISCELLANEOUS DAILY
Qty: 30 EACH | Refills: 1 | Status: SHIPPED | OUTPATIENT
Start: 2018-08-14 | End: 2024-09-26

## 2018-08-14 NOTE — TELEPHONE ENCOUNTER
Please inform patient that I will have our MTM pharmacist help out with this.  Possibly find a more affordable alternative  Thanks  I did MTM referral  Sean Dougherty MD

## 2018-08-14 NOTE — TELEPHONE ENCOUNTER
I called and spoke to Alda, advised her we'd wait and see if Dr. Dougherty might consider something less expensive.  Alda says they already paid the $1000 for a one-month supply so they do need the pen needles.    She says they have a $5000 deductible they have to spend so are interested in seeing if anything cheaper would be advised for the future.  Dr. Dougherty can address that tomorrow.    I cued up pen needles and routed to covering provider to address new Rx.    Caterina Jiang RN  [facility]

## 2018-08-14 NOTE — TELEPHONE ENCOUNTER
Reason for Call:  Other  - Medication Question: liraglutide (VICTOZA) 18 MG/3ML soln    Detailed comments: Alda called and stated Dr. Dougherty ordered the pens but not the actual needles to administer the pens. She also stated that this medication costs $1000 / month. She would like to know what their options are because patient cannot afford to spend that much. She asked if Dr. Dougherty could call her to discuss.    Phone Number Patient can be reached at: Home number on file [phone] (home)    Best Time: Anytime    Can we leave a detailed message on this number? YES    Call taken on 8/14/2018 at 9:59 AM by Kendra Javier

## 2018-08-14 NOTE — PROGRESS NOTES
Lots of protein in the urine this time, but the blood test for kidney function ( creatinine ) is still normal.    Overall diabetes test ( hemoglobin a1c ) is high as expected.    Triglycerides are high.     Other labs are okay.    Sean Dougherty MD

## 2018-08-14 NOTE — TELEPHONE ENCOUNTER
Alda called back really wanting to talk to Dr. Dougherty or his nurse about this issue..  She is concerned since the patient was suppose to start taking his medication last night

## 2018-08-14 NOTE — TELEPHONE ENCOUNTER
Before addressing the need for Victoza needles is there an alternative medication to Victoza? It costs him 1,000/month.    Routed to provider to advise.  Lorelei Ureña RN  [facility] [[email or web address]]

## 2018-08-14 NOTE — TELEPHONE ENCOUNTER
Routed to covering provider to please refill pen needles T'd up.    Will call patient back after.    Gege Benitez RN [facility] Triage.

## 2018-08-14 NOTE — TELEPHONE ENCOUNTER
Called  Ela Archuleta () [phone] (H)     The mailbox stated it was full.  Unable to leave a message.  eGge Benitez RN [facility] Triage.

## 2018-08-15 NOTE — TELEPHONE ENCOUNTER
I called pharmacy, patient has not yet picked up the pen needles.    I called and spoke to Alda, she says pharmacy did call, they will pick these up.   I advised her to expect call from [facility] soon as well to address cost of meds/plan    Caterina Jiang RN  [facility]

## 2019-01-03 ENCOUNTER — TELEPHONE (OUTPATIENT)
Dept: FAMILY MEDICINE | Facility: CLINIC | Age: 49
End: 2019-01-03

## 2019-01-03 DIAGNOSIS — E11.9 TYPE 2 DIABETES MELLITUS WITHOUT COMPLICATION, WITHOUT LONG-TERM CURRENT USE OF INSULIN (H): ICD-10-CM

## 2019-01-03 NOTE — TELEPHONE ENCOUNTER
"Requested Prescriptions   Pending Prescriptions Disp Refills     metFORMIN (GLUCOPHAGE) 1000 MG tablet [Pharmacy Med Name: METFORMIN 1000MG TABLETS] 180 tablet 0    Last Written Prescription Date:  8-13-18  Last Fill Quantity: 180,  # refills: 1   Last office visit: 8/13/2018 with prescribing provider:  8-13-18   Future Office Visit:     Sig: TAKE 1 TABLET BY MOUTH TWICE DAILY WITH MEALS    Biguanide Agents Failed - 1/3/2019  3:08 AM       Failed - Blood pressure less than 140/90 in past 6 months    BP Readings from Last 3 Encounters:   08/13/18 165/90   12/27/17 (!) 162/92   03/30/17 138/85                Failed - Patient has documented A1c within the specified period of time.    If HgbA1C is 8 or greater, it needs to be on file within the past 3 months.  If less than 8, must be on file within the past 6 months.     Recent Labs   Lab Test 08/13/18  0754   A1C 10.3*            Passed - Patient has documented LDL within the past 12 mos.    Recent Labs   Lab Test 08/13/18  0754   LDL 73            Passed - Patient has had a Microalbumin in the past 15 mos.    Recent Labs   Lab Test 08/13/18  0803   MICROL 384   UMALCR 372.82*            Passed - Patient is age 10 or older       Passed - Patient's CR is NOT>1.4 OR Patient's EGFR is NOT<45 within past 12 mos.    Recent Labs   Lab Test 08/13/18  0754   GFRESTIMATED >90   GFRESTBLACK >90       Recent Labs   Lab Test 08/13/18  0754   CR 0.69            Passed - Patient does NOT have a diagnosis of CHF.       Passed - Recent (6 mo) or future (30 days) visit within the authorizing provider's specialty    Patient had office visit in the last 6 months or has a visit in the next 30 days with authorizing provider or within the authorizing provider's specialty.  See \"Patient Info\" tab in inbasket, or \"Choose Columns\" in Meds & Orders section of the refill encounter.              "

## 2019-01-07 NOTE — TELEPHONE ENCOUNTER
"Patient has not followed up in \"late September\" as advised at last visit 8/13/18.    Medication is being filled for 1 time refill only due to:  Patient needs to be seen because overdue for diabetes follow up.     Encounter routed to team to reach out to patient to schedule.    Caterina Jiang RN  [facility]            "

## 2019-01-07 NOTE — TELEPHONE ENCOUNTER
Called patient and informed him that he needs and appointment within the next 30 days to continue to get refills of his medication. Patient stated he will call back and schedule.     Claudia Greenberg RN

## 2019-01-07 NOTE — TELEPHONE ENCOUNTER
Called patient at [phone]. Left message to return call to nurse triage line at [phone].    Claudia Greenberg RN

## 2019-11-22 DIAGNOSIS — I10 ESSENTIAL HYPERTENSION WITH GOAL BLOOD PRESSURE LESS THAN 140/90: ICD-10-CM

## 2019-11-22 RX ORDER — AMLODIPINE BESYLATE 10 MG/1
TABLET ORAL
Qty: 90 TABLET | Refills: 0 | OUTPATIENT
Start: 2019-11-22

## 2019-11-22 NOTE — TELEPHONE ENCOUNTER
"Requested Prescriptions   Pending Prescriptions Disp Refills     amLODIPine (NORVASC) 10 MG tablet [Pharmacy Med Name: AMLODIPINE BESYLATE 10MG TABLETS]  Last Written Prescription Date:  11/22/19  Last Fill Quantity: 30,  # refills: 0   Last office visit: 8/13/2018 with prescribing provider:  Ladonna   Future Office Visit:     90 tablet 0     Sig: TAKE 1 TABLET BY MOUTH DAILY       Calcium Channel Blockers Protocol  Failed - 11/22/2019  1:50 PM        Failed - Blood pressure under 140/90 in past 12 months     BP Readings from Last 3 Encounters:   08/13/18 165/90   12/27/17 (!) 162/92   03/30/17 138/85                 Failed - Recent (12 mo) or future (30 days) visit within the authorizing provider's specialty     Patient has had an office visit with the authorizing provider or a provider within the authorizing providers department within the previous 12 mos or has a future within next 30 days. See \"Patient Info\" tab in inbasket, or \"Choose Columns\" in Meds & Orders section of the refill encounter.              Failed - Normal serum creatinine on file in past 12 months     Recent Labs   Lab Test 08/13/18  0754   CR 0.69             Passed - Medication is active on med list        Passed - Patient is age 18 or older          "

## 2020-01-13 ENCOUNTER — OFFICE VISIT (OUTPATIENT)
Dept: FAMILY MEDICINE | Facility: CLINIC | Age: 50
End: 2020-01-13
Payer: COMMERCIAL

## 2020-01-13 VITALS
OXYGEN SATURATION: 98 % | BODY MASS INDEX: 38.08 KG/M2 | WEIGHT: 266 LBS | HEIGHT: 70 IN | SYSTOLIC BLOOD PRESSURE: 172 MMHG | HEART RATE: 93 BPM | TEMPERATURE: 99 F | DIASTOLIC BLOOD PRESSURE: 100 MMHG

## 2020-01-13 DIAGNOSIS — E11.9 TYPE 2 DIABETES MELLITUS WITHOUT COMPLICATION, WITHOUT LONG-TERM CURRENT USE OF INSULIN (H): Primary | ICD-10-CM

## 2020-01-13 DIAGNOSIS — Z12.11 SCREEN FOR COLON CANCER: ICD-10-CM

## 2020-01-13 DIAGNOSIS — I10 ESSENTIAL HYPERTENSION WITH GOAL BLOOD PRESSURE LESS THAN 140/90: ICD-10-CM

## 2020-01-13 DIAGNOSIS — Z12.5 SCREENING FOR PROSTATE CANCER: ICD-10-CM

## 2020-01-13 DIAGNOSIS — R53.83 FATIGUE, UNSPECIFIED TYPE: ICD-10-CM

## 2020-01-13 DIAGNOSIS — E66.9 OBESITY, UNSPECIFIED OBESITY SEVERITY, UNSPECIFIED OBESITY TYPE: ICD-10-CM

## 2020-01-13 DIAGNOSIS — E78.5 HYPERLIPIDEMIA LDL GOAL <100: ICD-10-CM

## 2020-01-13 LAB
ALBUMIN SERPL-MCNC: 3.6 G/DL (ref 3.4–5)
ALP SERPL-CCNC: 73 U/L (ref 40–150)
ALT SERPL W P-5'-P-CCNC: 30 U/L (ref 0–70)
ANION GAP SERPL CALCULATED.3IONS-SCNC: 8 MMOL/L (ref 3–14)
AST SERPL W P-5'-P-CCNC: 17 U/L (ref 0–45)
BASOPHILS # BLD AUTO: 0 10E9/L (ref 0–0.2)
BASOPHILS NFR BLD AUTO: 0.8 %
BILIRUB SERPL-MCNC: 0.5 MG/DL (ref 0.2–1.3)
BUN SERPL-MCNC: 8 MG/DL (ref 7–30)
CALCIUM SERPL-MCNC: 8.5 MG/DL (ref 8.5–10.1)
CHLORIDE SERPL-SCNC: 104 MMOL/L (ref 94–109)
CHOLEST SERPL-MCNC: 182 MG/DL
CO2 SERPL-SCNC: 23 MMOL/L (ref 20–32)
CREAT SERPL-MCNC: 0.57 MG/DL (ref 0.66–1.25)
CREAT UR-MCNC: 72 MG/DL
DIFFERENTIAL METHOD BLD: NORMAL
EOSINOPHIL # BLD AUTO: 0.1 10E9/L (ref 0–0.7)
EOSINOPHIL NFR BLD AUTO: 1.7 %
ERYTHROCYTE [DISTWIDTH] IN BLOOD BY AUTOMATED COUNT: 12.8 % (ref 10–15)
GFR SERPL CREATININE-BSD FRML MDRD: >90 ML/MIN/{1.73_M2}
GLUCOSE SERPL-MCNC: 260 MG/DL (ref 70–99)
HBA1C MFR BLD: 10.7 % (ref 0–5.6)
HCT VFR BLD AUTO: 42.4 % (ref 40–53)
HDLC SERPL-MCNC: 45 MG/DL
HGB BLD-MCNC: 14.7 G/DL (ref 13.3–17.7)
LDLC SERPL CALC-MCNC: 97 MG/DL
LYMPHOCYTES # BLD AUTO: 1.3 10E9/L (ref 0.8–5.3)
LYMPHOCYTES NFR BLD AUTO: 24.3 %
MCH RBC QN AUTO: 29.2 PG (ref 26.5–33)
MCHC RBC AUTO-ENTMCNC: 34.7 G/DL (ref 31.5–36.5)
MCV RBC AUTO: 84 FL (ref 78–100)
MICROALBUMIN UR-MCNC: 2650 MG/L
MICROALBUMIN/CREAT UR: 3685.67 MG/G CR (ref 0–17)
MONOCYTES # BLD AUTO: 0.5 10E9/L (ref 0–1.3)
MONOCYTES NFR BLD AUTO: 9 %
NEUTROPHILS # BLD AUTO: 3.4 10E9/L (ref 1.6–8.3)
NEUTROPHILS NFR BLD AUTO: 64.2 %
NONHDLC SERPL-MCNC: 137 MG/DL
PLATELET # BLD AUTO: 200 10E9/L (ref 150–450)
POTASSIUM SERPL-SCNC: 3.7 MMOL/L (ref 3.4–5.3)
PROT SERPL-MCNC: 7.6 G/DL (ref 6.8–8.8)
PSA SERPL-ACNC: 0.29 UG/L (ref 0–4)
RBC # BLD AUTO: 5.03 10E12/L (ref 4.4–5.9)
SODIUM SERPL-SCNC: 135 MMOL/L (ref 133–144)
TRIGL SERPL-MCNC: 199 MG/DL
TSH SERPL DL<=0.005 MIU/L-ACNC: 1.64 MU/L (ref 0.4–4)
WBC # BLD AUTO: 5.2 10E9/L (ref 4–11)

## 2020-01-13 PROCEDURE — 82043 UR ALBUMIN QUANTITATIVE: CPT | Performed by: FAMILY MEDICINE

## 2020-01-13 PROCEDURE — 83036 HEMOGLOBIN GLYCOSYLATED A1C: CPT | Performed by: FAMILY MEDICINE

## 2020-01-13 PROCEDURE — 99207 C FOOT EXAM  NO CHARGE: CPT | Mod: 25 | Performed by: FAMILY MEDICINE

## 2020-01-13 PROCEDURE — 80061 LIPID PANEL: CPT | Performed by: FAMILY MEDICINE

## 2020-01-13 PROCEDURE — G0103 PSA SCREENING: HCPCS | Performed by: FAMILY MEDICINE

## 2020-01-13 PROCEDURE — 80050 GENERAL HEALTH PANEL: CPT | Performed by: FAMILY MEDICINE

## 2020-01-13 PROCEDURE — 36415 COLL VENOUS BLD VENIPUNCTURE: CPT | Performed by: FAMILY MEDICINE

## 2020-01-13 PROCEDURE — 99214 OFFICE O/P EST MOD 30 MIN: CPT | Performed by: FAMILY MEDICINE

## 2020-01-13 RX ORDER — SIMVASTATIN 20 MG
TABLET ORAL
Qty: 90 TABLET | Refills: 3 | Status: SHIPPED | OUTPATIENT
Start: 2020-01-13 | End: 2020-10-27

## 2020-01-13 RX ORDER — AMLODIPINE BESYLATE 10 MG/1
10 TABLET ORAL DAILY
Qty: 90 TABLET | Refills: 1 | Status: SHIPPED | OUTPATIENT
Start: 2020-01-13 | End: 2020-07-14

## 2020-01-13 RX ORDER — CARVEDILOL 25 MG/1
TABLET ORAL
Qty: 180 TABLET | Refills: 3 | Status: SHIPPED | OUTPATIENT
Start: 2020-01-13 | End: 2021-02-15

## 2020-01-13 RX ORDER — LISINOPRIL AND HYDROCHLOROTHIAZIDE 20; 25 MG/1; MG/1
TABLET ORAL
Qty: 180 TABLET | Refills: 1 | Status: SHIPPED | OUTPATIENT
Start: 2020-01-13 | End: 2020-07-14

## 2020-01-13 RX ORDER — GLIPIZIDE 10 MG/1
TABLET ORAL
Qty: 180 TABLET | Refills: 1 | Status: SHIPPED | OUTPATIENT
Start: 2020-01-13 | End: 2020-07-14

## 2020-01-13 ASSESSMENT — MIFFLIN-ST. JEOR: SCORE: 2067.82

## 2020-01-13 ASSESSMENT — PAIN SCALES - GENERAL: PAINLEVEL: NO PAIN (0)

## 2020-01-13 NOTE — LETTER
[facility]   [address] [place]  [address]  [phone]                                   January 15, 2020    Davie Banuelos  [address] [address]  [place] [address]        Dear Davie,    As expected the diabetes test ( hemoglobin a1c ) is very high.    There is also a lot of protein in the urine.    Other labs not too bad.    Start to take the meds as we discussed.    See us in 2 1/2 to 3 months, sooner if needed.    Results for orders placed or performed in visit on 01/13/20   Albumin Random Urine Quantitative with Creat Ratio     Status: Abnormal   Result Value Ref Range    Creatinine Urine 72 mg/dL    Albumin Urine mg/L 2,650 mg/L    Albumin Urine mg/g Cr 3,685.67 (H) 0 - 17 mg/g Cr   Hemoglobin A1c     Status: Abnormal   Result Value Ref Range    Hemoglobin A1C 10.7 (H) 0 - 5.6 %   TSH with free T4 reflex     Status: None   Result Value Ref Range    TSH 1.64 0.40 - 4.00 mU/L   Comprehensive metabolic panel     Status: Abnormal   Result Value Ref Range    Sodium 135 133 - 144 mmol/L    Potassium 3.7 3.4 - 5.3 mmol/L    Chloride 104 94 - 109 mmol/L    Carbon Dioxide 23 20 - 32 mmol/L    Anion Gap 8 3 - 14 mmol/L    Glucose 260 (H) 70 - 99 mg/dL    Urea Nitrogen 8 7 - 30 mg/dL    Creatinine 0.57 (L) 0.66 - 1.25 mg/dL    GFR Estimate >90 >60 mL/min/[1.73_m2]    GFR Estimate If Black >90 >60 mL/min/[1.73_m2]    Calcium 8.5 8.5 - 10.1 mg/dL    Bilirubin Total 0.5 0.2 - 1.3 mg/dL    Albumin 3.6 3.4 - 5.0 g/dL    Protein Total 7.6 6.8 - 8.8 g/dL    Alkaline Phosphatase 73 40 - 150 U/L    ALT 30 0 - 70 U/L    AST 17 0 - 45 U/L   Prostate spec antigen screen     Status: None   Result Value Ref Range    PSA 0.29 0 - 4 ug/L   Lipid panel reflex to direct LDL Fasting     Status: Abnormal   Result Value Ref Range    Cholesterol 182 <200 mg/dL    Triglycerides 199 (H) <150 mg/dL    HDL Cholesterol 45 >39 mg/dL    LDL Cholesterol Calculated 97 <100 mg/dL    Non HDL Cholesterol  137 (H) <130 mg/dL   CBC with platelets differential     Status: None   Result Value Ref Range    WBC 5.2 4.0 - 11.0 10e9/L    RBC Count 5.03 4.4 - 5.9 10e12/L    Hemoglobin 14.7 13.3 - 17.7 g/dL    Hematocrit 42.4 40.0 - 53.0 %    MCV 84 78 - 100 fl    MCH 29.2 26.5 - 33.0 pg    MCHC 34.7 31.5 - 36.5 g/dL    RDW 12.8 10.0 - 15.0 %    Platelet Count 200 150 - 450 10e9/L    % Neutrophils 64.2 %    % Lymphocytes 24.3 %    % Monocytes 9.0 %    % Eosinophils 1.7 %    % Basophils 0.8 %    Absolute Neutrophil 3.4 1.6 - 8.3 10e9/L    Absolute Lymphocytes 1.3 0.8 - 5.3 10e9/L    Absolute Monocytes 0.5 0.0 - 1.3 10e9/L    Absolute Eosinophils 0.1 0.0 - 0.7 10e9/L    Absolute Basophils 0.0 0.0 - 0.2 10e9/L    Diff Method Automated Method        If you have any questions please call the clinic at [phone]    Sincerely,    Sean Dougherty MD  bmd

## 2020-01-13 NOTE — PROGRESS NOTES
Subjective     Davie Banuelos is a 49 year old male who presents to clinic today for the following health issues:    HPI   Diabetes Follow-up    How often are you checking your blood sugar? One time daily  What time of day are you checking your blood sugars (select all that apply)?  Before meals  Have you had any blood sugars above 200?  Yes once weekly  Have you had any blood sugars below 70?  No    What symptoms do you notice when your blood sugar is low?  Confusion    What concerns do you have today about your diabetes? Other: ran out of medication and victoza to expensive     Do you have any of these symptoms? (Select all that apply)  No numbness or tingling in feet.  No redness, sores or blisters on feet.  No complaints of excessive thirst.  No reports of blurry vision.  No significant changes to weight.     Have you had a diabetic eye exam in the last 12 months? No. Has appt today.    Diabetes Management Resources    Hyperlipidemia Follow-Up      Are you regularly taking any medication or supplement to lower your cholesterol?   Yes- .    Are you having muscle aches or other side effects that you think could be caused by your cholesterol lowering medication?  No    Hypertension Follow-up      Do you check your blood pressure regularly outside of the clinic? Yes     Are you following a low salt diet? no    Are your blood pressures ever more than 140 on the top number (systolic) OR more   than 90 on the bottom number (diastolic), for example 140/90? Yes    BP Readings from Last 2 Encounters:   01/13/20 (!) 196/95   08/13/18 165/90     Hemoglobin A1C (%)   Date Value   08/13/2018 10.3 (H)   03/30/2017 10.7 (H)     LDL Cholesterol Calculated (mg/dL)   Date Value   08/13/2018 73   03/30/2017 84         How many servings of fruits and vegetables do you eat daily?  2-3    On average, how many sweetened beverages do you drink each day (Examples: soda, juice, sweet tea, etc.  Do NOT count diet or artificially  "sweetened beverages)?   0    How many days per week do you miss taking your medication? 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  Physical Exam  Constitutional:       Appearance: He is well-developed.   HENT:      Head: Normocephalic and atraumatic.   Eyes:      Conjunctiva/sclera: Conjunctivae normal.   Neck:      Vascular: No carotid bruit.   Cardiovascular:      Rate and Rhythm: Normal rate and regular rhythm.      Heart sounds: Normal heart sounds.   Pulmonary:      Effort: Pulmonary effort is normal. No respiratory distress.      Breath sounds: Normal breath sounds.   Neurological:      Mental Status: He is alert and oriented to person, place, and time.      Cranial Nerves: No cranial nerve deficit.   Psychiatric:         Speech: Speech normal.         Behavior: Behavior normal.         foot exam normal  Except some thickened fungal  Type nails    Diagnostic Test Results:  Labs reviewed in Epic            ASSESSMENT / PLAN:  (E11.9) Type 2 diabetes mellitus without complication, without long-term current use of insulin (H)  (primary encounter diagnosis)  Comment: refill/ restart the po meds.   Also printed prescription for glp1 med ; he can talk with pharmacist about this and they can substitute another in this " class if needed based on cost  Plan: Albumin Random Urine Quantitative with Creat         Ratio, FOOT EXAM, Hemoglobin A1c, dulaglutide         (TRULICITY) 0.75 MG/0.5ML pen, metFORMIN         (GLUCOPHAGE) 1000 MG tablet, glipiZIDE         (GLUCOTROL) 10 MG tablet        Plan to see us back in 3 months    (E78.5) Hyperlipidemia LDL goal <100  Comment: refill med, recheck labs   Plan: Comprehensive metabolic panel, Lipid panel         reflex to direct LDL Fasting, simvastatin         (ZOCOR) 20 MG tablet             (Z12.5) Screening for prostate cancer  Comment: psa    Plan: Prostate spec antigen screen             (R53.83) Fatigue, unspecified type  Comment: check   Plan: TSH with free T4 reflex, CBC with platelets         differential             (I10) Essential hypertension with goal blood pressure less than 140/90  Comment: refill/ restart meds   Plan: carvedilol (COREG) 25 MG tablet,         lisinopril-hydrochlorothiazide         (PRINZIDE/ZESTORETIC) 20-25 MG tablet,         amLODIPine (NORVASC) 10 MG tablet             (E66.9) Obesity, unspecified obesity severity, unspecified obesity type  Comment: chronic   Plan: keep working on healthy diet/exercise as able     (Z12.11) Screen for colon cancer  Comment: patient states he just got fit test sent to him from insurance company   Plan: as above    Patient has  Eye exam today     Had pneumonia shot in [place]        I reviewed the patient's medications, allergies, medical history, family history, and social history.    Sean Dougherty MD

## 2020-01-13 NOTE — PATIENT INSTRUCTIONS
Return to clinic in about 3 months    Talk with pharmacist about trulicity or similar medication    Keep working on healthy diet/exercise     Restart other meds

## 2020-01-15 NOTE — RESULT ENCOUNTER NOTE
As expected the diabetes test ( hemoglobin a1c ) is very high.    There is also a lot of protein in the urine.    Other labs not too bad.    Start/ take the meds as we discussed.    See us in 2 1/2 to 3 months, sooner if needed.    Sean Dougherty MD

## 2020-07-13 DIAGNOSIS — E11.9 TYPE 2 DIABETES MELLITUS WITHOUT COMPLICATION, WITHOUT LONG-TERM CURRENT USE OF INSULIN (H): ICD-10-CM

## 2020-07-13 DIAGNOSIS — I10 ESSENTIAL HYPERTENSION WITH GOAL BLOOD PRESSURE LESS THAN 140/90: ICD-10-CM

## 2020-07-14 RX ORDER — GLIPIZIDE 10 MG/1
TABLET ORAL
Qty: 180 TABLET | Refills: 0 | Status: SHIPPED | OUTPATIENT
Start: 2020-07-14 | End: 2020-10-29

## 2020-07-14 RX ORDER — LISINOPRIL AND HYDROCHLOROTHIAZIDE 20; 25 MG/1; MG/1
TABLET ORAL
Qty: 180 TABLET | Refills: 0 | Status: SHIPPED | OUTPATIENT
Start: 2020-07-14 | End: 2020-10-29

## 2020-07-14 RX ORDER — AMLODIPINE BESYLATE 10 MG/1
10 TABLET ORAL DAILY
Qty: 90 TABLET | Refills: 0 | Status: SHIPPED | OUTPATIENT
Start: 2020-07-14 | End: 2020-10-29

## 2020-07-14 NOTE — TELEPHONE ENCOUNTER
"Requested Prescriptions   Pending Prescriptions Disp Refills    amLODIPine (NORVASC) 10 MG tablet 90 tablet 1     Sig: Take 1 tablet (10 mg) by mouth daily       Calcium Channel Blockers Protocol  Failed - 7/13/2020  8:35 AM        Failed - Blood pressure under 140/90 in past 12 months     BP Readings from Last 3 Encounters:   01/13/20 (!) 172/100   08/13/18 165/90   12/27/17 (!) 162/92                 Failed - Normal serum creatinine on file in past 12 months     Recent Labs   Lab Test 01/13/20  0732   CR 0.57*       Ok to refill medication if creatinine is low          Passed - Recent (12 mo) or future (30 days) visit within the authorizing provider's specialty     Patient has had an office visit with the authorizing provider or a provider within the authorizing providers department within the previous 12 mos or has a future within next 30 days. See \"Patient Info\" tab in inbasket, or \"Choose Columns\" in Meds & Orders section of the refill encounter.              Passed - Medication is active on med list        Passed - Patient is age 18 or older          lisinopril-hydrochlorothiazide (ZESTORETIC) 20-25 MG tablet 180 tablet 1     Sig: TAKE 2 TABLETS BY MOUTH DAILY.       Diuretics (Including Combos) Protocol Failed - 7/13/2020  8:35 AM        Failed - Blood pressure under 140/90 in past 12 months     BP Readings from Last 3 Encounters:   01/13/20 (!) 172/100   08/13/18 165/90   12/27/17 (!) 162/92                 Failed - Normal serum creatinine on file in past 12 months     Recent Labs   Lab Test 01/13/20  0732   CR 0.57*              Passed - Recent (12 mo) or future (30 days) visit within the authorizing provider's specialty     Patient has had an office visit with the authorizing provider or a provider within the authorizing providers department within the previous 12 mos or has a future within next 30 days. See \"Patient Info\" tab in inbasket, or \"Choose Columns\" in Meds & Orders section of the refill " "encounter.              Passed - Medication is active on med list        Passed - Patient is age 18 or older        Passed - Normal serum potassium on file in past 12 months     Recent Labs   Lab Test 01/13/20  0732   POTASSIUM 3.7                    Passed - Normal serum sodium on file in past 12 months     Recent Labs   Lab Test 01/13/20  0732                ACE Inhibitors (Including Combos) Protocol Failed - 7/13/2020  8:35 AM        Failed - Blood pressure under 140/90 in past 12 months     BP Readings from Last 3 Encounters:   01/13/20 (!) 172/100   08/13/18 165/90   12/27/17 (!) 162/92                 Failed - Normal serum creatinine on file in past 12 months     Recent Labs   Lab Test 01/13/20  0732   CR 0.57*       Ok to refill medication if creatinine is low          Passed - Recent (12 mo) or future (30 days) visit within the authorizing provider's specialty     Patient has had an office visit with the authorizing provider or a provider within the authorizing providers department within the previous 12 mos or has a future within next 30 days. See \"Patient Info\" tab in inbasket, or \"Choose Columns\" in Meds & Orders section of the refill encounter.              Passed - Medication is active on med list        Passed - Patient is age 18 or older        Passed - Normal serum potassium on file in past 12 months     Recent Labs   Lab Test 01/13/20  0732   POTASSIUM 3.7               glipiZIDE (GLUCOTROL) 10 MG tablet 180 tablet 1     Sig: TAKE 1 TABLET BY MOUTH TWICE DAILY BEFORE A MEAL       Sulfonylurea Agents Failed - 7/13/2020  8:35 AM        Failed - Patient has documented A1c within the specified period of time.     If HgbA1C is 8 or greater, it needs to be on file within the past 3 months.  If less than 8, must be on file within the past 6 months.     Recent Labs   Lab Test 01/13/20  0732   A1C 10.7*             Failed - Patient has a recent creatinine (normal) within the past 12 mos.     Recent " "Labs   Lab Test 01/13/20  0732   CR 0.57*       Ok to refill medication if creatinine is low          Failed - Recent (6 mo) or future (30 days) visit within the authorizing provider's specialty     Patient had office visit in the last 6 months or has a visit in the next 30 days with authorizing provider or within the authorizing provider's specialty.  See \"Patient Info\" tab in inbasket, or \"Choose Columns\" in Meds & Orders section of the refill encounter.            Passed - Medication is active on med list        Passed - Patient is age 18 or older         Routing refill request to provider for review/approval because:  Failed protocol.  Gege Benitez RN,BSN  [facility]    "

## 2020-07-15 NOTE — TELEPHONE ENCOUNTER
Okay refills but see us in clinic in the next 1-2 months    Please inform patient    Sean Dougherty MD

## 2020-07-17 NOTE — TELEPHONE ENCOUNTER
Patient will call back to schedule appointment.    Thank you,  Dannielle BUTTS  [facility]  Team Cinthya Coordinator

## 2020-10-14 NOTE — TELEPHONE ENCOUNTER
Adult female returned call to RN line and left message for call back to her at [phone], says she is calling on behalf of Davie Banuelos.  Caterina Jiang RN  [facility]     [facility]  [address] [address]  Phone: [phone]                                    Daniel Galarza   MRN: [id number]    Department: [facility]   Date of Visit: 10/14/2020           After Visit Summary Signature Page    I have received my discharge instructions, and my questions have been answered. I have discussed any challenges I see with this plan with the nurse or doctor.    ..........................................................................................................................................  Patient/Patient Representative Signature      ..........................................................................................................................................  Patient Representative Print Name and Relationship to Patient    ..................................................               ................................................  Date                                   Time    ..........................................................................................................................................  Reviewed by Signature/Title    ...................................................              ..............................................  Date                                               Time          22EPIC Rev 08/18

## 2020-10-27 DIAGNOSIS — E11.9 TYPE 2 DIABETES MELLITUS WITHOUT COMPLICATION, WITHOUT LONG-TERM CURRENT USE OF INSULIN (H): ICD-10-CM

## 2020-10-27 DIAGNOSIS — I10 ESSENTIAL HYPERTENSION WITH GOAL BLOOD PRESSURE LESS THAN 140/90: ICD-10-CM

## 2020-10-28 ENCOUNTER — TELEPHONE (OUTPATIENT)
Dept: FAMILY MEDICINE | Facility: CLINIC | Age: 50
End: 2020-10-28

## 2020-10-28 NOTE — TELEPHONE ENCOUNTER
Please see refill encounter. One month fill only until patient is seen.     Claudia Greenberg RN

## 2020-10-28 NOTE — TELEPHONE ENCOUNTER
metFORMIN (GLUCOPHAGE) 1000 MG tablet 60 tablet 0 10/27/2020       Patient is requesting a 90 day supply

## 2020-10-29 RX ORDER — AMLODIPINE BESYLATE 10 MG/1
10 TABLET ORAL DAILY
Qty: 90 TABLET | Refills: 0 | Status: SHIPPED | OUTPATIENT
Start: 2020-10-29 | End: 2021-02-15

## 2020-10-29 RX ORDER — LISINOPRIL AND HYDROCHLOROTHIAZIDE 20; 25 MG/1; MG/1
TABLET ORAL
Qty: 180 TABLET | Refills: 0 | Status: SHIPPED | OUTPATIENT
Start: 2020-10-29 | End: 2021-02-15

## 2020-10-29 RX ORDER — GLIPIZIDE 10 MG/1
TABLET ORAL
Qty: 180 TABLET | Refills: 0 | Status: SHIPPED | OUTPATIENT
Start: 2020-10-29 | End: 2021-02-15

## 2020-10-29 NOTE — TELEPHONE ENCOUNTER
Okayed refills but needs to be seen soon in clinic    Please inform patient    Sean Dougherty MD

## 2020-10-29 NOTE — TELEPHONE ENCOUNTER
Routing refill request to provider for review/approval because:  Failed protocols: blood pressure, creatinine       Claudia Greenberg RN

## 2020-10-30 ENCOUNTER — TELEPHONE (OUTPATIENT)
Dept: FAMILY MEDICINE | Facility: CLINIC | Age: 50
End: 2020-10-30

## 2020-10-30 DIAGNOSIS — E78.5 HYPERLIPIDEMIA LDL GOAL <100: ICD-10-CM

## 2020-10-30 RX ORDER — SIMVASTATIN 20 MG
20 TABLET ORAL AT BEDTIME
Qty: 30 TABLET | Refills: 0 | OUTPATIENT
Start: 2020-10-30

## 2020-10-30 NOTE — TELEPHONE ENCOUNTER
Needs office visit. Nurse cued up medication and refused as given 30 day tor - needs office visit.      Vicki Phillips RN  Triage Nurse  [facility]  Appointment line: [phone]  [facility], 24 hour nurse line, available by calling clinic at [phone] and following prompts.

## 2020-12-09 DIAGNOSIS — E11.9 TYPE 2 DIABETES MELLITUS WITHOUT COMPLICATION, WITHOUT LONG-TERM CURRENT USE OF INSULIN (H): ICD-10-CM

## 2020-12-09 NOTE — TELEPHONE ENCOUNTER
"Requested Prescriptions   Pending Prescriptions Disp Refills    metFORMIN (GLUCOPHAGE) 1000 MG tablet 60 tablet 0       Biguanide Agents Failed - 12/9/2020 11:51 AM        Failed - Patient has documented A1c within the specified period of time.     If HgbA1C is 8 or greater, it needs to be on file within the past 3 months.  If less than 8, must be on file within the past 6 months.     Recent Labs   Lab Test 01/13/20  0732   A1C 10.7*             Failed - Recent (6 mo) or future (30 days) visit within the authorizing provider's specialty     Patient had office visit in the last 6 months or has a visit in the next 30 days with authorizing provider or within the authorizing provider's specialty.  See \"Patient Info\" tab in inbasket, or \"Choose Columns\" in Meds & Orders section of the refill encounter.            Passed - Patient is age 10 or older        Passed - Patient's CR is NOT>1.4 OR Patient's EGFR is NOT<45 within past 12 mos.     Recent Labs   Lab Test 01/13/20  0732   GFRESTIMATED >90   GFRESTBLACK >90       Recent Labs   Lab Test 01/13/20  0732   CR 0.57*             Passed - Patient does NOT have a diagnosis of CHF.        Passed - Medication is active on med list           Routing refill request to provider for review/approval because:  Failed protocol.  Gege YOONN-RN  [facility]    "

## 2021-01-02 ENCOUNTER — TRANSFERRED RECORDS (OUTPATIENT)
Dept: HEALTH INFORMATION MANAGEMENT | Facility: CLINIC | Age: 51
End: 2021-01-02

## 2021-01-02 LAB — RETINOPATHY: NORMAL

## 2021-02-01 DIAGNOSIS — E11.9 TYPE 2 DIABETES MELLITUS WITHOUT COMPLICATION, WITHOUT LONG-TERM CURRENT USE OF INSULIN (H): ICD-10-CM

## 2021-02-01 NOTE — TELEPHONE ENCOUNTER
Reason for Call:  Medication or medication refill:    Do you use a [facility]?  Name of the pharmacy and phone number for the current request:  [id number] - [place], [place] - [address] [facility] [address] AT [facility] [place]    Name of the medication requested: metFORMIN (GLUCOPHAGE) 1000 MG tablet    Can we leave a detailed message on this number? YES    Phone number patient can be reached at: Home number on file [phone] (home)    Best Time: anytime    Call taken on 2/1/2021 at 12:30 PM by Dhruv Levin

## 2021-02-03 NOTE — CONFIDENTIAL NOTE
Medication is being filled for 1 time refill only due to:  Patient needs to be seen because it has been more than one year since last visit.

## 2021-02-15 ENCOUNTER — OFFICE VISIT (OUTPATIENT)
Dept: FAMILY MEDICINE | Facility: CLINIC | Age: 51
End: 2021-02-15
Payer: COMMERCIAL

## 2021-02-15 VITALS
BODY MASS INDEX: 39.3 KG/M2 | TEMPERATURE: 97.6 F | SYSTOLIC BLOOD PRESSURE: 146 MMHG | WEIGHT: 274.5 LBS | DIASTOLIC BLOOD PRESSURE: 85 MMHG | HEART RATE: 89 BPM | HEIGHT: 70 IN

## 2021-02-15 DIAGNOSIS — J31.0 CHRONIC RHINITIS: ICD-10-CM

## 2021-02-15 DIAGNOSIS — Z12.5 SCREENING FOR PROSTATE CANCER: ICD-10-CM

## 2021-02-15 DIAGNOSIS — Z12.11 SCREEN FOR COLON CANCER: ICD-10-CM

## 2021-02-15 DIAGNOSIS — Z00.00 ROUTINE GENERAL MEDICAL EXAMINATION AT A HEALTH CARE FACILITY: Primary | ICD-10-CM

## 2021-02-15 DIAGNOSIS — R53.83 FATIGUE, UNSPECIFIED TYPE: ICD-10-CM

## 2021-02-15 DIAGNOSIS — E66.9 OBESITY, UNSPECIFIED OBESITY SEVERITY, UNSPECIFIED OBESITY TYPE: ICD-10-CM

## 2021-02-15 DIAGNOSIS — I10 ESSENTIAL HYPERTENSION WITH GOAL BLOOD PRESSURE LESS THAN 140/90: ICD-10-CM

## 2021-02-15 DIAGNOSIS — E78.5 HYPERLIPIDEMIA LDL GOAL <100: ICD-10-CM

## 2021-02-15 DIAGNOSIS — E11.319 TYPE 2 DIABETES MELLITUS WITH RETINOPATHY, WITHOUT LONG-TERM CURRENT USE OF INSULIN, MACULAR EDEMA PRESENCE UNSPECIFIED, UNSPECIFIED LATERALITY, UNSPECIFIED RETINOPATHY SEVERITY (H): ICD-10-CM

## 2021-02-15 LAB
ALBUMIN SERPL-MCNC: 3.6 G/DL (ref 3.4–5)
ALP SERPL-CCNC: 74 U/L (ref 40–150)
ALT SERPL W P-5'-P-CCNC: 27 U/L (ref 0–70)
ANION GAP SERPL CALCULATED.3IONS-SCNC: 7 MMOL/L (ref 3–14)
AST SERPL W P-5'-P-CCNC: 12 U/L (ref 0–45)
BASOPHILS # BLD AUTO: 0 10E9/L (ref 0–0.2)
BASOPHILS NFR BLD AUTO: 0.5 %
BILIRUB SERPL-MCNC: 0.4 MG/DL (ref 0.2–1.3)
BUN SERPL-MCNC: 13 MG/DL (ref 7–30)
CALCIUM SERPL-MCNC: 9 MG/DL (ref 8.5–10.1)
CHLORIDE SERPL-SCNC: 98 MMOL/L (ref 94–109)
CHOLEST SERPL-MCNC: 183 MG/DL
CO2 SERPL-SCNC: 28 MMOL/L (ref 20–32)
CREAT SERPL-MCNC: 0.79 MG/DL (ref 0.66–1.25)
CREAT UR-MCNC: 66 MG/DL
DIFFERENTIAL METHOD BLD: NORMAL
EOSINOPHIL # BLD AUTO: 0.2 10E9/L (ref 0–0.7)
EOSINOPHIL NFR BLD AUTO: 2 %
ERYTHROCYTE [DISTWIDTH] IN BLOOD BY AUTOMATED COUNT: 12.8 % (ref 10–15)
GFR SERPL CREATININE-BSD FRML MDRD: >90 ML/MIN/{1.73_M2}
GLUCOSE SERPL-MCNC: 270 MG/DL (ref 70–99)
HBA1C MFR BLD: 11.3 % (ref 0–5.6)
HCT VFR BLD AUTO: 41.2 % (ref 40–53)
HDLC SERPL-MCNC: 45 MG/DL
HGB BLD-MCNC: 14.2 G/DL (ref 13.3–17.7)
LDLC SERPL CALC-MCNC: 88 MG/DL
LYMPHOCYTES # BLD AUTO: 1.4 10E9/L (ref 0.8–5.3)
LYMPHOCYTES NFR BLD AUTO: 18.9 %
MCH RBC QN AUTO: 28.7 PG (ref 26.5–33)
MCHC RBC AUTO-ENTMCNC: 34.5 G/DL (ref 31.5–36.5)
MCV RBC AUTO: 83 FL (ref 78–100)
MICROALBUMIN UR-MCNC: 1750 MG/L
MICROALBUMIN/CREAT UR: 2663.62 MG/G CR (ref 0–17)
MONOCYTES # BLD AUTO: 0.6 10E9/L (ref 0–1.3)
MONOCYTES NFR BLD AUTO: 7.3 %
NEUTROPHILS # BLD AUTO: 5.4 10E9/L (ref 1.6–8.3)
NEUTROPHILS NFR BLD AUTO: 71.3 %
NONHDLC SERPL-MCNC: 138 MG/DL
PLATELET # BLD AUTO: 223 10E9/L (ref 150–450)
POTASSIUM SERPL-SCNC: 3.6 MMOL/L (ref 3.4–5.3)
PROT SERPL-MCNC: 7.8 G/DL (ref 6.8–8.8)
PSA SERPL-ACNC: 0.31 UG/L (ref 0–4)
RBC # BLD AUTO: 4.94 10E12/L (ref 4.4–5.9)
SODIUM SERPL-SCNC: 133 MMOL/L (ref 133–144)
TRIGL SERPL-MCNC: 250 MG/DL
TSH SERPL DL<=0.005 MIU/L-ACNC: 1.51 MU/L (ref 0.4–4)
WBC # BLD AUTO: 7.6 10E9/L (ref 4–11)

## 2021-02-15 PROCEDURE — 99396 PREV VISIT EST AGE 40-64: CPT | Performed by: FAMILY MEDICINE

## 2021-02-15 PROCEDURE — G0103 PSA SCREENING: HCPCS | Performed by: FAMILY MEDICINE

## 2021-02-15 PROCEDURE — 36415 COLL VENOUS BLD VENIPUNCTURE: CPT | Performed by: FAMILY MEDICINE

## 2021-02-15 PROCEDURE — 99207 PR FOOT EXAM NO CHARGE: CPT | Mod: 25 | Performed by: FAMILY MEDICINE

## 2021-02-15 PROCEDURE — 80061 LIPID PANEL: CPT | Performed by: FAMILY MEDICINE

## 2021-02-15 PROCEDURE — 80050 GENERAL HEALTH PANEL: CPT | Performed by: FAMILY MEDICINE

## 2021-02-15 PROCEDURE — 82043 UR ALBUMIN QUANTITATIVE: CPT | Performed by: FAMILY MEDICINE

## 2021-02-15 PROCEDURE — 99213 OFFICE O/P EST LOW 20 MIN: CPT | Mod: 25 | Performed by: FAMILY MEDICINE

## 2021-02-15 PROCEDURE — 83036 HEMOGLOBIN GLYCOSYLATED A1C: CPT | Performed by: FAMILY MEDICINE

## 2021-02-15 RX ORDER — AMLODIPINE BESYLATE 10 MG/1
10 TABLET ORAL DAILY
Qty: 90 TABLET | Refills: 3 | Status: SHIPPED | OUTPATIENT
Start: 2021-02-15 | End: 2022-02-17

## 2021-02-15 RX ORDER — GLIPIZIDE 10 MG/1
TABLET ORAL
Qty: 180 TABLET | Refills: 1 | Status: SHIPPED | OUTPATIENT
Start: 2021-02-15 | End: 2021-08-13

## 2021-02-15 RX ORDER — FLUTICASONE PROPIONATE 50 MCG
2 SPRAY, SUSPENSION (ML) NASAL DAILY
Qty: 16 G | Refills: 11 | Status: SHIPPED | OUTPATIENT
Start: 2021-02-15

## 2021-02-15 RX ORDER — LIRAGLUTIDE 6 MG/ML
1.8 INJECTION SUBCUTANEOUS DAILY
Qty: 9 ML | Refills: 11 | Status: SHIPPED | OUTPATIENT
Start: 2021-02-15 | End: 2022-08-09

## 2021-02-15 RX ORDER — LISINOPRIL AND HYDROCHLOROTHIAZIDE 20; 25 MG/1; MG/1
TABLET ORAL
Qty: 180 TABLET | Refills: 3 | Status: SHIPPED | OUTPATIENT
Start: 2021-02-15 | End: 2022-02-17

## 2021-02-15 RX ORDER — CARVEDILOL 25 MG/1
TABLET ORAL
Qty: 180 TABLET | Refills: 3 | Status: SHIPPED | OUTPATIENT
Start: 2021-02-15 | End: 2022-02-17

## 2021-02-15 RX ORDER — SIMVASTATIN 20 MG
20 TABLET ORAL AT BEDTIME
Qty: 90 TABLET | Refills: 3 | Status: SHIPPED | OUTPATIENT
Start: 2021-02-15 | End: 2022-02-17

## 2021-02-15 SDOH — ECONOMIC STABILITY: FOOD INSECURITY: WITHIN THE PAST 12 MONTHS, THE FOOD YOU BOUGHT JUST DIDN'T LAST AND YOU DIDN'T HAVE MONEY TO GET MORE.: NOT ASKED

## 2021-02-15 SDOH — ECONOMIC STABILITY: TRANSPORTATION INSECURITY
IN THE PAST 12 MONTHS, HAS LACK OF TRANSPORTATION KEPT YOU FROM MEETINGS, WORK, OR FROM GETTING THINGS NEEDED FOR DAILY LIVING?: NOT ASKED

## 2021-02-15 SDOH — ECONOMIC STABILITY: FOOD INSECURITY: WITHIN THE PAST 12 MONTHS, YOU WORRIED THAT YOUR FOOD WOULD RUN OUT BEFORE YOU GOT MONEY TO BUY MORE.: NOT ASKED

## 2021-02-15 SDOH — ECONOMIC STABILITY: TRANSPORTATION INSECURITY
IN THE PAST 12 MONTHS, HAS THE LACK OF TRANSPORTATION KEPT YOU FROM MEDICAL APPOINTMENTS OR FROM GETTING MEDICATIONS?: NOT ASKED

## 2021-02-15 SDOH — ECONOMIC STABILITY: INCOME INSECURITY: HOW HARD IS IT FOR YOU TO PAY FOR THE VERY BASICS LIKE FOOD, HOUSING, MEDICAL CARE, AND HEATING?: NOT ASKED

## 2021-02-15 ASSESSMENT — ENCOUNTER SYMPTOMS
DIARRHEA: 0
COUGH: 0
HEMATOCHEZIA: 0
ABDOMINAL PAIN: 0
CONSTIPATION: 0
HEMATURIA: 0
CHILLS: 0

## 2021-02-15 ASSESSMENT — PAIN SCALES - GENERAL: PAINLEVEL: NO PAIN (0)

## 2021-02-15 ASSESSMENT — MIFFLIN-ST. JEOR: SCORE: 2101.37

## 2021-02-15 NOTE — PATIENT INSTRUCTIONS
Talk with pharmacist about victoza or similar med    Meet with diabetes educator/ dietician    Keep working on healthy diet/exercise and weight loss    We will send you lab results    Return the FIT stool test

## 2021-02-15 NOTE — PROGRESS NOTES
SUBJECTIVE:   CC: Davie Banuelos is an 50 year old male who presents for preventative health visit.        Patient has been advised of split billing requirements and indicates understanding: Yes  Healthy Habits:     Getting at least 3 servings of Calcium per day:  Yes    Bi-annual eye exam:  Yes    Dental care twice a year:  NO    Sleep apnea or symptoms of sleep apnea:  None    Diet:  Low salt and Diabetic    Frequency of exercise:  2-3 days/week    Duration of exercise:  15-30 minutes    Taking medications regularly:  Yes    Medication side effects:  None    PHQ-2 Total Score: 0    Additional concerns today:  No          Diabetes and blood pressure check up    Today's PHQ-2 Score:   PHQ-2 ( 1999 [employer]) 2/15/2021   Q1: Little interest or pleasure in doing things 0   Q2: Feeling down, depressed or hopeless 0   PHQ-2 Score 0   Q1: Little interest or pleasure in doing things Not at all   Q2: Feeling down, depressed or hopeless Not at all   PHQ-2 Score 0       Abuse: Current or Past(Physical, Sexual or Emotional)- No  Do you feel safe in your environment? Yes        Social History     Tobacco Use     Smoking status: Never Smoker     Smokeless tobacco: Never Used   Substance Use Topics     Alcohol use: No     If you drink alcohol do you typically have >3 drinks per day or >7 drinks per week? No    Alcohol Use 2/15/2021   Prescreen: >3 drinks/day or >7 drinks/week? No   Prescreen: >3 drinks/day or >7 drinks/week? -   No flowsheet data found.    Last PSA:   PSA   Date Value Ref Range Status   01/13/2020 0.29 0 - 4 ug/L Final     Comment:     Assay Method:  Chemiluminescence using Siemens Vista analyzer       Reviewed orders with patient. Reviewed health maintenance and updated orders accordingly - Yes       Reviewed and updated as needed this visit by clinical staff  Tobacco  Allergies  Meds   Med Hx  Surg Hx  Fam Hx  Soc Hx        Reviewed and updated as needed this visit by Provider                    Review of  Systems   Constitutional: Negative for chills.   HENT: Negative for congestion.    Respiratory: Negative for cough.    Cardiovascular: Negative for chest pain.   Gastrointestinal: Negative for abdominal pain, constipation, diarrhea and hematochezia.   Genitourinary: Negative for hematuria.     No walking in a month    Lots of ice on street    Thinking of anytime fitness     60 / 40 good / bad diet    Sugars lately 488    Not on trulicity    When on this, numbers down to 75 or 80        OBJECTIVE:   Wt 124.5 kg (274 lb 8 oz)   BMI 39.39 kg/m      Physical Exam  GENERAL: healthy, alert and no distress  EYES: Eyes grossly normal to inspection, PERRL and conjunctivae and sclerae normal  HENT: normal cephalic/atraumatic, ear canals and TM's normal, oropharynx clear, oral mucous membranes moist and swelling of nasal mucosa   NECK: no adenopathy, no asymmetry, masses, or scars and thyroid normal to palpation  RESP: lungs clear to auscultation - no rales, rhonchi or wheezes  CV: regular rate and rhythm, normal S1 S2, no S3 or S4, no murmur, click or rub, no peripheral edema and peripheral pulses strong  ABDOMEN: soft, nontender, no hepatosplenomegaly, no masses and bowel sounds normal  MS: no gross musculoskeletal defects noted, no edema  SKIN: no suspicious lesions or rashes  NEURO: Normal strength and tone, mentation intact and speech normal  PSYCH: mentation appears normal, affect normal/bright  Diabetic foot exam: no trophic changes or ulcerative lesions and normal sensory exam  Good dors pedis pulses; sensation good light touch and vibration  Diagnostic Test Results:  Labs reviewed in Epic    Patient went [facility] ago for full eye exam, had some early diabetes changes in eyes     ASSESSMENT/PLAN:   Davie was seen today for physical, diabetes and health maintenance.    Diagnoses and all orders for this visit:    Routine general medical examination at a health care facility    Type 2 diabetes mellitus  with retinopathy, without long-term current use of insulin, macular edema presence unspecified, unspecified laterality, unspecified retinopathy severity (H)  -     Albumin Random Urine Quantitative with Creat Ratio  -     FOOT EXAM  -     liraglutide (VICTOZA) 18 MG/3ML solution; Inject 1.8 mg Subcutaneous daily  -     Hemoglobin A1c  -     glipiZIDE (GLUCOTROL) 10 MG tablet; TAKE 1 TABLET BY MOUTH TWICE DAILY BEFORE A MEAL  -     metFORMIN (GLUCOPHAGE) 1000 MG tablet; 1 po bid  -     AMBULATORY ADULT DIABETES EDUCATOR REFERRAL; Future    Essential hypertension with goal blood pressure less than 140/90  -     amLODIPine (NORVASC) 10 MG tablet; Take 1 tablet (10 mg) by mouth daily  -     carvedilol (COREG) 25 MG tablet; TAKE 1 TABLET BY MOUTH TWICE DAILY WITH MEALS  -     lisinopril-hydrochlorothiazide (ZESTORETIC) 20-25 MG tablet; TAKE 2 TABLETS BY MOUTH DAILY.    Chronic rhinitis  -     fluticasone (FLONASE) 50 MCG/ACT nasal spray; Spray 2 sprays into both nostrils daily    Hyperlipidemia LDL goal <100  -     Comprehensive metabolic panel  -     Lipid panel reflex to direct LDL Fasting  -     simvastatin (ZOCOR) 20 MG tablet; Take 1 tablet (20 mg) by mouth At Bedtime    Screen for colon cancer  -     Fecal colorectal cancer screen (FIT); Future    Screening for prostate cancer  -     Prostate spec antigen screen    Fatigue, unspecified type  -     TSH with free T4 reflex  -     CBC with platelets differential    Obesity, unspecified obesity severity, unspecified obesity type    Discussed multiple issues with patient   Sugars high; would be nice to get patient on a glp med again.  trulicity too expensive.  Did paper prescription for victoza; he will talk with pharmacist about this  He prefers no insulin  Refill other po diab meds  Keep working on healthy diet/exercise and wt loss  Meet with diab educators  Refill chol med  Had eye exam  Declined colonoscopy; gave fit test  No std concern  Check labs fasting  Blood  "pressure still high on recheck but not real bad; on good doses of 4 meds in  3 pills. Return to walking as able  Trial of flonase      Patient has been advised of split billing requirements and indicates understanding: Yes  COUNSELING:   Reviewed preventive health counseling, as reflected in patient instructions       Regular exercise       Healthy diet/nutrition       Vision screening       Safe sex practices/STD prevention       Colon cancer screening       Prostate cancer screening    Estimated body mass index is 39.39 kg/m  as calculated from the following:    Height as of 1/13/20: 1.778 m (5' 10\").    Weight as of this encounter: 124.5 kg (274 lb 8 oz).     Weight management plan: Discussed healthy diet and exercise guidelines    He reports that he has never smoked. He has never used smokeless tobacco.      Counseling Resources:  ATP IV Guidelines  Pooled Cohorts Equation Calculator  FRAX Risk Assessment  ICSI Preventive Guidelines  Dietary Guidelines for Americans, 2010  USDA's MyPlate  ASA Prophylaxis  Lung CA Screening    Sean Dougherty MD  [facility]  "

## 2021-02-15 NOTE — LETTER
February 16, 2021      Davie Banuelos  [address] [address]        Dear ,    We are writing to inform you of your test results.    As expected the diabetes test ( hemoglobin a1c ) is quite high.       Talk with pharmacist about Victoza or similar medication and meet with the diabetes educator.       Keep working on healthy diet/exercise and weight loss.     You still have a lot of protein in the urine, but better than last time.     Blood test for kidney function ( creatinine ) is still normal.     Triglycerides are moderately high.     Other labs are okay.     Advise seeing us in about 3 months.      Resulted Orders   Albumin Random Urine Quantitative with Creat Ratio   Result Value Ref Range    Creatinine Urine 66 mg/dL    Albumin Urine mg/L 1,750 mg/L    Albumin Urine mg/g Cr 2,663.62 (H) 0 - 17 mg/g Cr   Hemoglobin A1c   Result Value Ref Range    Hemoglobin A1C 11.3 (H) 0 - 5.6 %      Comment:      Normal <5.7% Prediabetes 5.7-6.4%  Diabetes 6.5% or higher - adopted from ADA   consensus guidelines.     TSH with free T4 reflex   Result Value Ref Range    TSH 1.51 0.40 - 4.00 mU/L   Comprehensive metabolic panel   Result Value Ref Range    Sodium 133 133 - 144 mmol/L    Potassium 3.6 3.4 - 5.3 mmol/L    Chloride 98 94 - 109 mmol/L    Carbon Dioxide 28 20 - 32 mmol/L    Anion Gap 7 3 - 14 mmol/L    Glucose 270 (H) 70 - 99 mg/dL      Comment:      Fasting specimen    Urea Nitrogen 13 7 - 30 mg/dL    Creatinine 0.79 0.66 - 1.25 mg/dL    GFR Estimate >90 >60 mL/min/[1.73_m2]      Comment:      Non  GFR Calc  Starting 12/18/2018, serum creatinine based estimated GFR (eGFR) will be   calculated using the Chronic Kidney Disease Epidemiology Collaboration   (CKD-EPI) equation.      GFR Estimate If Black >90 >60 mL/min/[1.73_m2]      Comment:       GFR Calc  Starting 12/18/2018, serum creatinine based estimated GFR (eGFR) will be   calculated using the  Chronic Kidney Disease Epidemiology Collaboration   (CKD-EPI) equation.      Calcium 9.0 8.5 - 10.1 mg/dL    Bilirubin Total 0.4 0.2 - 1.3 mg/dL    Albumin 3.6 3.4 - 5.0 g/dL    Protein Total 7.8 6.8 - 8.8 g/dL    Alkaline Phosphatase 74 40 - 150 U/L    ALT 27 0 - 70 U/L    AST 12 0 - 45 U/L   Prostate spec antigen screen   Result Value Ref Range    PSA 0.31 0 - 4 ug/L      Comment:      Assay Method:  Chemiluminescence using Siemens Vista analyzer   Lipid panel reflex to direct LDL Fasting   Result Value Ref Range    Cholesterol 183 <200 mg/dL    Triglycerides 250 (H) <150 mg/dL      Comment:      Borderline high:  150-199 mg/dl  High:             200-499 mg/dl  Very high:       >499 mg/dl  Fasting specimen      HDL Cholesterol 45 >39 mg/dL    LDL Cholesterol Calculated 88 <100 mg/dL      Comment:      Desirable:       <100 mg/dl    Non HDL Cholesterol 138 (H) <130 mg/dL      Comment:      Above Desirable:  130-159 mg/dl  Borderline high:  160-189 mg/dl  High:             190-219 mg/dl  Very high:       >219 mg/dl     CBC with platelets differential   Result Value Ref Range    WBC 7.6 4.0 - 11.0 10e9/L    RBC Count 4.94 4.4 - 5.9 10e12/L    Hemoglobin 14.2 13.3 - 17.7 g/dL    Hematocrit 41.2 40.0 - 53.0 %    MCV 83 78 - 100 fl    MCH 28.7 26.5 - 33.0 pg    MCHC 34.5 31.5 - 36.5 g/dL    RDW 12.8 10.0 - 15.0 %    Platelet Count 223 150 - 450 10e9/L    Diff Method Automated Method     % Neutrophils 71.3 %    % Lymphocytes 18.9 %    % Monocytes 7.3 %    % Eosinophils 2.0 %    % Basophils 0.5 %    Absolute Neutrophil 5.4 1.6 - 8.3 10e9/L    Absolute Lymphocytes 1.4 0.8 - 5.3 10e9/L    Absolute Monocytes 0.6 0.0 - 1.3 10e9/L    Absolute Eosinophils 0.2 0.0 - 0.7 10e9/L    Absolute Basophils 0.0 0.0 - 0.2 10e9/L       If you have any questions or concerns, please call the clinic at the number listed above.       Sincerely,      Sean Dougherty MD/kristin

## 2021-02-16 NOTE — RESULT ENCOUNTER NOTE
As expected the diabetes test ( hemoglobin a1c ) is quite high.      Talk with pharmacist about Victoza or similar medication and meet with the diabetes educator.      Keep working on healthy diet/exercise and weight loss.    You still have a lot of protein in the urine, but better than last time.    Blood test for kidney function ( creatinine ) is still normal.    Triglycerides are moderately high.    Other labs are okay.    Advise seeing us in about 3 months.    Sean Dougherty MD

## 2021-02-17 ENCOUNTER — TELEPHONE (OUTPATIENT)
Dept: FAMILY MEDICINE | Facility: CLINIC | Age: 51
End: 2021-02-17

## 2021-02-17 NOTE — TELEPHONE ENCOUNTER
Diabetes Education Scheduling Outreach #1:    Call to patient to schedule. Left message with phone number to call to schedule.    Plan for 2nd outreach attempt within 1 week.    [facility]  Diabetes and Nutrition Scheduling

## 2021-03-04 ENCOUNTER — TELEPHONE (OUTPATIENT)
Dept: FAMILY MEDICINE | Facility: CLINIC | Age: 51
End: 2021-03-04

## 2021-03-04 DIAGNOSIS — J01.90 ACUTE SINUSITIS WITH SYMPTOMS > 10 DAYS: Primary | ICD-10-CM

## 2021-03-04 NOTE — TELEPHONE ENCOUNTER
Reason for Call:  Other prescription    Detailed comments: Sinus meds are not working well, she thinks patient has a sinus infection and needs an antibiotic.    Phone Number Patient can be reached at: Other phone number:  [phone]*    Best Time: As soon as possible    Can we leave a detailed message on this number? YES    Call taken on 3/4/2021 at 2:26 PM by Tamara Ho

## 2021-03-05 RX ORDER — AMOXICILLIN 500 MG/1
500 CAPSULE ORAL 3 TIMES DAILY
Qty: 30 CAPSULE | Refills: 0 | Status: SHIPPED | OUTPATIENT
Start: 2021-03-05 | End: 2021-07-08

## 2021-03-05 NOTE — TELEPHONE ENCOUNTER
I sent in prescription for antibiotic    Follow up if symptoms not resolving    Please inform patient    Sean Dougherty MD

## 2021-03-05 NOTE — TELEPHONE ENCOUNTER
"Patient was seen 2/15/21 by Dr. Dougherty for routine visit.  Due back in May.    I see flonase Rx for \"chronic rhinitis\" that day.    No consent to communicate with anyone on file.  Alda Archuleta is calling.    I called home number, patient is at work.   Alda says patient is spitting up green post-nasal drainage, complaining of teeth and ears hurting, head ache and sinus pressure.   Symptoms have been ongoing since before he was seen in February.    Flonase helped open the nasal passages a bit but one side is always plugged at any given time.    Routed to Dr. Dougherty to address request for antibiotic for presumed sinus infection.    Caterina Jiang RN  [facility]          "

## 2021-03-05 NOTE — TELEPHONE ENCOUNTER
I called home number, spoke to Alda and advised of Dr. Dougherty's message, Rx sent.    Alda will let Davie know.    Caterina iJang RN  [facility]

## 2021-04-05 NOTE — TELEPHONE ENCOUNTER
Diabetes Education Scheduling Outreach #2:    Call to patient to schedule. Left message with phone number to call to schedule.    Yessenia Tony  [place] [facility]  Diabetes and Nutrition Scheduling

## 2021-07-08 ENCOUNTER — VIRTUAL VISIT (OUTPATIENT)
Dept: FAMILY MEDICINE | Facility: CLINIC | Age: 51
End: 2021-07-08
Payer: COMMERCIAL

## 2021-07-08 DIAGNOSIS — E11.65 TYPE 2 DIABETES MELLITUS WITH HYPERGLYCEMIA, WITHOUT LONG-TERM CURRENT USE OF INSULIN (H): ICD-10-CM

## 2021-07-08 DIAGNOSIS — J01.90 ACUTE SINUSITIS WITH SYMPTOMS > 10 DAYS: Primary | ICD-10-CM

## 2021-07-08 PROCEDURE — 99213 OFFICE O/P EST LOW 20 MIN: CPT | Mod: TEL | Performed by: NURSE PRACTITIONER

## 2021-07-08 RX ORDER — DOXYCYCLINE 100 MG/1
100 CAPSULE ORAL 2 TIMES DAILY
Qty: 14 CAPSULE | Refills: 0 | Status: SHIPPED | OUTPATIENT
Start: 2021-07-08 | End: 2021-07-15

## 2021-07-08 NOTE — PATIENT INSTRUCTIONS
Check with pharmacy on coverage of Trulicity, Bydureon, Ozemic, Byetta for diabetes control.     Please call [facility] Roman to discuss medication assistance [phone] with these meds as well.

## 2021-07-08 NOTE — PROGRESS NOTES
Davie is a 50 year old who is being evaluated via a billable telephone visit.      What phone number would you like to be contacted at? [phone]  How would you like to obtain your AVS? Mail a copy    Assessment & Plan     Acute sinusitis with symptoms > 10 days    - doxycycline monohydrate (MONODOX) 100 MG capsule; Take 1 capsule (100 mg) by mouth 2 times daily for 7 days    Type 2 diabetes mellitus with hyperglycemia, without long-term current use of insulin (H)  Patient to check with pharmacy for cheapest copay for GLP-1, list provided in AVS.  Patient also given information to talk with [employer] representative for drug programs for medication assistance.      Prescription drug management             Return in about 4 weeks (around 8/5/2021) for Diabetic Check, with PCP..    HALEY Ferrari CNP  [facility]    Hosea Broderick is a 50 year old who presents for the following health issues     HPI     Acute Illness  Acute illness concerns:  Sinus infection  Onset/Duration: 1 month  Symptoms:  Fever: no  Chills/Sweats: no  Headache (location?): yes in the morning  Sinus Pressure: YES- left maxillary  Conjunctivitis:  no  Ear Pain: no  Rhinorrhea: no  Congestion: YES- yellow/green discharge  Sore Throat: no  Cough: yes in the mornings  Wheeze: no  Decreased Appetite: YES  Nausea: no  Vomiting: no  Diarrhea: no  Dysuria/Freq.: no  Dysuria or Hematuria: no  Fatigue/Achiness: yes-fatigue  Sick/Strep Exposure: no  Therapies tried and outcome: was on amoxicillin and got better but not totally gone      Patient notes that liraglutide was helping lower his blood sugars numbers, but is too expensive.  He is working through [employer] for approval of medication, but has not heard back.    Review of Systems   Constitutional, HEENT, cardiovascular, pulmonary, gi and gu systems are negative, except as otherwise noted.      Objective           Vitals:  No vitals were obtained today due to  virtual visit.    Physical Exam   healthy, alert and no distress  PSYCH: Alert and oriented times 3; coherent speech, normal   rate and volume, able to articulate logical thoughts, able   to abstract reason, no tangential thoughts, no hallucinations   or delusions  His affect is normal  RESP: No cough, no audible wheezing, able to talk in full sentences  Remainder of exam unable to be completed due to telephone visits                Phone call duration: 8 minutes

## 2021-08-01 ENCOUNTER — TRANSFERRED RECORDS (OUTPATIENT)
Dept: HEALTH INFORMATION MANAGEMENT | Facility: CLINIC | Age: 51
End: 2021-08-01

## 2021-08-01 LAB — RETINOPATHY: NORMAL

## 2021-09-07 ENCOUNTER — TELEPHONE (OUTPATIENT)
Dept: FAMILY MEDICINE | Facility: CLINIC | Age: 51
End: 2021-09-07

## 2021-09-07 DIAGNOSIS — E11.319 TYPE 2 DIABETES MELLITUS WITH RETINOPATHY, WITHOUT LONG-TERM CURRENT USE OF INSULIN, MACULAR EDEMA PRESENCE UNSPECIFIED, UNSPECIFIED LATERALITY, UNSPECIFIED RETINOPATHY SEVERITY (H): ICD-10-CM

## 2021-09-07 NOTE — LETTER
[facility]  [address]  [place] [address]  [phone]          September 14, 2021    Davie Banuelos                                                                                                                     [address] [address]            Dear Davie,    Your provider has sent a  tor refill of metformin. You are due for an appointment for further refills.  Please contact the clinic to schedule an appointment for further refills.             Sincerely,       Team Adriel Dougherty MD

## 2021-09-09 NOTE — TELEPHONE ENCOUNTER
Medication is being filled for 1 time refill only due to:  Patient needs to be seen because over due for diabetic check .   Please call patient to schedule a diabetic check, should be seen every 3 months due to elevated A1C.   Thanks!    Merna Fisher RN

## 2021-09-13 NOTE — TELEPHONE ENCOUNTER
Called patient to schedule. No answer. If patient calls back please assist with scheduling     Nilesh-

## 2021-12-08 DIAGNOSIS — E11.319 TYPE 2 DIABETES MELLITUS WITH RETINOPATHY, WITHOUT LONG-TERM CURRENT USE OF INSULIN, MACULAR EDEMA PRESENCE UNSPECIFIED, UNSPECIFIED LATERALITY, UNSPECIFIED RETINOPATHY SEVERITY (H): ICD-10-CM

## 2021-12-08 NOTE — LETTER
December 10, 2021      Davie Banuelos  [address] [address]        Dear Davie,     Your provider has sent a 90 day tor refill of metFORMIN (GLUCOPHAGE) 1000 MG tablet. You are due for an appointment for further refills. Please contact the clinic to schedule an appointment for further refills.        Sincerely,        Sean Dougherty MD

## 2021-12-10 NOTE — TELEPHONE ENCOUNTER
Okayed one more refill but needs to be seen soon in clinic    Please inform patient    Sean Dougherty MD

## 2021-12-10 NOTE — TELEPHONE ENCOUNTER
Routing refill request to provider for review/approval because:  Lottie given x1 and patient did not follow up, please advise

## 2022-02-15 DIAGNOSIS — I10 ESSENTIAL HYPERTENSION WITH GOAL BLOOD PRESSURE LESS THAN 140/90: ICD-10-CM

## 2022-02-15 DIAGNOSIS — E11.319 TYPE 2 DIABETES MELLITUS WITH RETINOPATHY, WITHOUT LONG-TERM CURRENT USE OF INSULIN, MACULAR EDEMA PRESENCE UNSPECIFIED, UNSPECIFIED LATERALITY, UNSPECIFIED RETINOPATHY SEVERITY (H): ICD-10-CM

## 2022-02-15 DIAGNOSIS — E78.5 HYPERLIPIDEMIA LDL GOAL <100: ICD-10-CM

## 2022-02-15 NOTE — LETTER
February 17, 2022      Davie Banuelos  [address] [address] [address]        Dear Davie,     Your provider has sent a 90 day tor refill of simvastatin (ZOCOR) 20 MG tablet, carvedilol (COREG) 25 MG tablet, amLODIPine (NORVASC) 10 MG tablet, lisinopril-hydrochlorothiazide (ZESTORETIC) 20-25 MG tablet, and metFORMIN (GLUCOPHAGE) 1000 MG tablet. You are due for an appointment and fasting lab work for further refills. Please contact the clinic to schedule an appointment for further refills.        Sincerely,        Sean Dougherty MD

## 2022-02-17 RX ORDER — CARVEDILOL 25 MG/1
TABLET ORAL
Qty: 180 TABLET | Refills: 0 | Status: SHIPPED | OUTPATIENT
Start: 2022-02-17 | End: 2022-06-20

## 2022-02-17 RX ORDER — LISINOPRIL AND HYDROCHLOROTHIAZIDE 20; 25 MG/1; MG/1
TABLET ORAL
Qty: 180 TABLET | Refills: 0 | Status: SHIPPED | OUTPATIENT
Start: 2022-02-17 | End: 2022-08-09

## 2022-02-17 RX ORDER — AMLODIPINE BESYLATE 10 MG/1
TABLET ORAL
Qty: 90 TABLET | Refills: 0 | Status: SHIPPED | OUTPATIENT
Start: 2022-02-17 | End: 2022-06-20

## 2022-02-17 RX ORDER — GLIPIZIDE 10 MG/1
TABLET ORAL
Qty: 180 TABLET | Refills: 0 | Status: SHIPPED | OUTPATIENT
Start: 2022-02-17 | End: 2022-08-09

## 2022-02-17 RX ORDER — SIMVASTATIN 20 MG
TABLET ORAL
Qty: 90 TABLET | Refills: 0 | Status: SHIPPED | OUTPATIENT
Start: 2022-02-17 | End: 2022-07-21

## 2022-02-17 NOTE — TELEPHONE ENCOUNTER
Medication is being filled for 1 time refill only due to:  Patient needs to be seen because it has been more than one year since last visit.     Please send letter to notify patient that visit is needed prior to next refill.  Will need to be fasting for labs    Merna Fisher RN

## 2022-06-17 DIAGNOSIS — I10 ESSENTIAL HYPERTENSION WITH GOAL BLOOD PRESSURE LESS THAN 140/90: ICD-10-CM

## 2022-06-20 RX ORDER — CARVEDILOL 25 MG/1
TABLET ORAL
Qty: 60 TABLET | Refills: 0 | Status: SHIPPED | OUTPATIENT
Start: 2022-06-20 | End: 2022-07-20

## 2022-06-20 RX ORDER — AMLODIPINE BESYLATE 10 MG/1
TABLET ORAL
Qty: 30 TABLET | Refills: 0 | Status: SHIPPED | OUTPATIENT
Start: 2022-06-20 | End: 2022-07-20

## 2022-07-18 DIAGNOSIS — I10 ESSENTIAL HYPERTENSION WITH GOAL BLOOD PRESSURE LESS THAN 140/90: ICD-10-CM

## 2022-07-20 DIAGNOSIS — E78.5 HYPERLIPIDEMIA LDL GOAL <100: ICD-10-CM

## 2022-07-20 RX ORDER — AMLODIPINE BESYLATE 10 MG/1
TABLET ORAL
Qty: 30 TABLET | Refills: 0 | Status: SHIPPED | OUTPATIENT
Start: 2022-07-20 | End: 2022-08-09

## 2022-07-20 RX ORDER — CARVEDILOL 25 MG/1
TABLET ORAL
Qty: 60 TABLET | Refills: 0 | Status: SHIPPED | OUTPATIENT
Start: 2022-07-20 | End: 2022-08-17

## 2022-07-20 NOTE — TELEPHONE ENCOUNTER
Medication is being filled for 1 time refill only due to:  Patient needs to be seen because it has been more than one year since last visit.   Patient is scheduled on 8/9/22.    Merna Fisher RN

## 2022-07-21 RX ORDER — SIMVASTATIN 20 MG
20 TABLET ORAL AT BEDTIME
Qty: 90 TABLET | Refills: 0 | Status: SHIPPED | OUTPATIENT
Start: 2022-07-21 | End: 2022-08-09

## 2022-08-09 ENCOUNTER — OFFICE VISIT (OUTPATIENT)
Dept: FAMILY MEDICINE | Facility: CLINIC | Age: 52
End: 2022-08-09
Payer: COMMERCIAL

## 2022-08-09 VITALS
BODY MASS INDEX: 38.85 KG/M2 | HEIGHT: 70 IN | DIASTOLIC BLOOD PRESSURE: 95 MMHG | WEIGHT: 271.38 LBS | SYSTOLIC BLOOD PRESSURE: 180 MMHG | TEMPERATURE: 98.4 F | OXYGEN SATURATION: 99 % | HEART RATE: 80 BPM

## 2022-08-09 DIAGNOSIS — I10 ESSENTIAL HYPERTENSION WITH GOAL BLOOD PRESSURE LESS THAN 140/90: ICD-10-CM

## 2022-08-09 DIAGNOSIS — E78.5 HYPERLIPIDEMIA LDL GOAL <100: ICD-10-CM

## 2022-08-09 DIAGNOSIS — E11.319 TYPE 2 DIABETES MELLITUS WITH RETINOPATHY, WITHOUT LONG-TERM CURRENT USE OF INSULIN, MACULAR EDEMA PRESENCE UNSPECIFIED, UNSPECIFIED LATERALITY, UNSPECIFIED RETINOPATHY SEVERITY (H): Primary | ICD-10-CM

## 2022-08-09 DIAGNOSIS — Z12.11 SCREEN FOR COLON CANCER: ICD-10-CM

## 2022-08-09 DIAGNOSIS — R53.83 FATIGUE, UNSPECIFIED TYPE: ICD-10-CM

## 2022-08-09 DIAGNOSIS — E66.01 MORBID OBESITY (H): ICD-10-CM

## 2022-08-09 DIAGNOSIS — Z12.5 SCREENING FOR PROSTATE CANCER: ICD-10-CM

## 2022-08-09 LAB
ALBUMIN SERPL-MCNC: 2.8 G/DL (ref 3.4–5)
ALP SERPL-CCNC: 66 U/L (ref 40–150)
ALT SERPL W P-5'-P-CCNC: 24 U/L (ref 0–70)
ANION GAP SERPL CALCULATED.3IONS-SCNC: 6 MMOL/L (ref 3–14)
AST SERPL W P-5'-P-CCNC: 14 U/L (ref 0–45)
BILIRUB SERPL-MCNC: 0.4 MG/DL (ref 0.2–1.3)
BUN SERPL-MCNC: 10 MG/DL (ref 7–30)
CALCIUM SERPL-MCNC: 8.2 MG/DL (ref 8.5–10.1)
CHLORIDE BLD-SCNC: 101 MMOL/L (ref 94–109)
CHOLEST SERPL-MCNC: 224 MG/DL
CO2 SERPL-SCNC: 28 MMOL/L (ref 20–32)
CREAT SERPL-MCNC: 0.92 MG/DL (ref 0.66–1.25)
CREAT UR-MCNC: 75 MG/DL
FASTING STATUS PATIENT QL REPORTED: YES
GFR SERPL CREATININE-BSD FRML MDRD: >90 ML/MIN/1.73M2
GLUCOSE BLD-MCNC: 327 MG/DL (ref 70–99)
HBA1C MFR BLD: 11.8 % (ref 0–5.6)
HDLC SERPL-MCNC: 48 MG/DL
LDLC SERPL CALC-MCNC: 109 MG/DL
MICROALBUMIN UR-MCNC: 5660 MG/L
MICROALBUMIN/CREAT UR: 7546.67 MG/G CR (ref 0–17)
NONHDLC SERPL-MCNC: 176 MG/DL
POTASSIUM BLD-SCNC: 3.4 MMOL/L (ref 3.4–5.3)
PROT SERPL-MCNC: 6.3 G/DL (ref 6.8–8.8)
PSA SERPL-MCNC: 0.29 UG/L (ref 0–4)
SODIUM SERPL-SCNC: 135 MMOL/L (ref 133–144)
TRIGL SERPL-MCNC: 333 MG/DL
TSH SERPL DL<=0.005 MIU/L-ACNC: 1.8 MU/L (ref 0.4–4)

## 2022-08-09 PROCEDURE — 82043 UR ALBUMIN QUANTITATIVE: CPT | Performed by: FAMILY MEDICINE

## 2022-08-09 PROCEDURE — G0103 PSA SCREENING: HCPCS | Performed by: FAMILY MEDICINE

## 2022-08-09 PROCEDURE — 36415 COLL VENOUS BLD VENIPUNCTURE: CPT | Performed by: FAMILY MEDICINE

## 2022-08-09 PROCEDURE — 99207 PR FOOT EXAM NO CHARGE: CPT | Performed by: FAMILY MEDICINE

## 2022-08-09 PROCEDURE — 80053 COMPREHEN METABOLIC PANEL: CPT | Performed by: FAMILY MEDICINE

## 2022-08-09 PROCEDURE — 83036 HEMOGLOBIN GLYCOSYLATED A1C: CPT | Performed by: FAMILY MEDICINE

## 2022-08-09 PROCEDURE — 80061 LIPID PANEL: CPT | Performed by: FAMILY MEDICINE

## 2022-08-09 PROCEDURE — 84443 ASSAY THYROID STIM HORMONE: CPT | Performed by: FAMILY MEDICINE

## 2022-08-09 PROCEDURE — 99214 OFFICE O/P EST MOD 30 MIN: CPT | Performed by: FAMILY MEDICINE

## 2022-08-09 RX ORDER — LIRAGLUTIDE 6 MG/ML
1.8 INJECTION SUBCUTANEOUS DAILY
Qty: 9 ML | Refills: 11 | Status: SHIPPED | OUTPATIENT
Start: 2022-08-09 | End: 2023-09-13

## 2022-08-09 RX ORDER — SIMVASTATIN 20 MG
20 TABLET ORAL AT BEDTIME
Qty: 90 TABLET | Refills: 3 | Status: SHIPPED | OUTPATIENT
Start: 2022-08-09 | End: 2023-05-22

## 2022-08-09 RX ORDER — LISINOPRIL AND HYDROCHLOROTHIAZIDE 20; 25 MG/1; MG/1
2 TABLET ORAL DAILY
Qty: 180 TABLET | Refills: 1 | Status: SHIPPED | OUTPATIENT
Start: 2022-08-09 | End: 2023-01-30

## 2022-08-09 RX ORDER — AMLODIPINE BESYLATE 10 MG/1
10 TABLET ORAL DAILY
Qty: 90 TABLET | Refills: 1 | Status: SHIPPED | OUTPATIENT
Start: 2022-08-09 | End: 2023-01-30

## 2022-08-09 RX ORDER — LANCETS
EACH MISCELLANEOUS
Qty: 100 EACH | Refills: 11 | Status: SHIPPED | OUTPATIENT
Start: 2022-08-09

## 2022-08-09 RX ORDER — CARVEDILOL 25 MG/1
TABLET ORAL
Qty: 60 TABLET | Refills: 0 | Status: CANCELLED | OUTPATIENT
Start: 2022-08-09

## 2022-08-09 RX ORDER — GLUCOSAMINE HCL/CHONDROITIN SU 500-400 MG
CAPSULE ORAL
Qty: 100 EACH | Refills: 3 | Status: SHIPPED | OUTPATIENT
Start: 2022-08-09

## 2022-08-09 RX ORDER — GLIPIZIDE 10 MG/1
TABLET ORAL
Qty: 180 TABLET | Refills: 1 | Status: SHIPPED | OUTPATIENT
Start: 2022-08-09 | End: 2023-01-30

## 2022-08-09 ASSESSMENT — PAIN SCALES - GENERAL: PAINLEVEL: NO PAIN (0)

## 2022-08-09 NOTE — LETTER
August 11, 2022      Davie Banuelos  [address] [address] [address]        Dear ,    We are writing to inform you of your test results.    Unfortunately the overall diabetes test ( hemoglobin a1c ) is still very high.       Try to get on the trulicity or victoza type med; talk with pharmacist about this, as we did send in the prescription.     Also see diabetes educator.     You do have a lot  of protein in the urine.  Make sure you are taking the lisinopril.  We may want to consider having you see kidney specialist.     The blood test for kidney function ( creatinine ) is still normal.     Cholesterol moderately high.  Take simvastatin and keep working on healthy diet/exercise.     Other labs are okay.     See us in a 2-3 months.         Resulted Orders   HEMOGLOBIN A1C   Result Value Ref Range    Hemoglobin A1C 11.8 (H) 0.0 - 5.6 %      Comment:      Normal <5.7%   Prediabetes 5.7-6.4%    Diabetes 6.5% or higher     Note: Adopted from ADA consensus guidelines.   Lipid panel reflex to direct LDL Non-fasting   Result Value Ref Range    Cholesterol 224 (H) <200 mg/dL    Triglycerides 333 (H) <150 mg/dL    Direct Measure HDL 48 >=40 mg/dL    LDL Cholesterol Calculated 109 (H) <=100 mg/dL    Non HDL Cholesterol 176 (H) <130 mg/dL    Patient Fasting > 8hrs? Yes     Narrative    Cholesterol  Desirable:  <200 mg/dL    Triglycerides  Normal:  Less than 150 mg/dL  Borderline High:  150-199 mg/dL  High:  200-499 mg/dL  Very High:  Greater than or equal to 500 mg/dL    Direct Measure HDL  Female:  Greater than or equal to 50 mg/dL   Male:  Greater than or equal to 40 mg/dL    LDL Cholesterol  Desirable:  <100mg/dL  Above Desirable:  100-129 mg/dL   Borderline High:  130-159 mg/dL   High:  160-189 mg/dL   Very High:  >= 190 mg/dL    Non HDL Cholesterol  Desirable:  130 mg/dL  Above Desirable:  130-159 mg/dL  Borderline High:  160-189 mg/dL  High:  190-219 mg/dL  Very High:  Greater than or equal to 220  mg/dL   Albumin Random Urine Quantitative with Creat Ratio   Result Value Ref Range    Creatinine Urine mg/dL 75 mg/dL    Albumin Urine mg/L 5,660 mg/L    Albumin Urine mg/g Cr 7,546.67 (H) 0.00 - 17.00 mg/g Cr   TSH with free T4 reflex   Result Value Ref Range    TSH 1.80 0.40 - 4.00 mU/L   Prostate Specific Antigen Screen   Result Value Ref Range    Prostate Specific Antigen Screen 0.29 0.00 - 4.00 ug/L   Comprehensive metabolic panel   Result Value Ref Range    Sodium 135 133 - 144 mmol/L    Potassium 3.4 3.4 - 5.3 mmol/L    Chloride 101 94 - 109 mmol/L    Carbon Dioxide (CO2) 28 20 - 32 mmol/L    Anion Gap 6 3 - 14 mmol/L    Urea Nitrogen 10 7 - 30 mg/dL    Creatinine 0.92 0.66 - 1.25 mg/dL    Calcium 8.2 (L) 8.5 - 10.1 mg/dL    Glucose 327 (H) 70 - 99 mg/dL    Alkaline Phosphatase 66 40 - 150 U/L    AST 14 0 - 45 U/L    ALT 24 0 - 70 U/L    Protein Total 6.3 (L) 6.8 - 8.8 g/dL    Albumin 2.8 (L) 3.4 - 5.0 g/dL    Bilirubin Total 0.4 0.2 - 1.3 mg/dL    GFR Estimate >90 >60 mL/min/1.73m2      Comment:      Effective December 21, 2021 eGFRcr in adults is calculated using the 2021 CKD-EPI creatinine equation which includes age and gender (Mal et al., NEJM, DOI: 10.1056/PMRJql3413332)       If you have any questions or concerns, please call the clinic at the number listed above.       Sincerely,      Sean Dougherty MD/kristin

## 2022-08-09 NOTE — PROGRESS NOTES
"Hosea Broderick is a 51 year old , presenting for the following health issues:  Diabetes (Fasting)      History of Present Illness       Diabetes:   He presents for follow up of diabetes.  He is checking home blood glucose one time daily. He checks blood glucose before meals.  Blood glucose is sometimes over 200 and never under 70. When his blood glucose is low, the patient is asymptomatic for confusion, blurred vision, lethargy and reports not feeling dizzy, shaky, or weak.  He is concerned about blood sugar frequently over 200.  He is having excessive thirst. The patient has had a diabetic eye exam in the last 12 months. Eye exam performed on August 2021. Location of last eye exam [facility] [place] [facility]        Hypertension: He presents for follow up of hypertension.  He does check blood pressure  regularly outside of the clinic. Outside blood pressures have been over 140/90. He follows a low salt diet.          Review of Systems   Bad sinus infection    Fell down stairs at work, kept working but light duty for 6 weeks    Just now getting back into the walking    Walking helps sleep a lot    Ran out of glipizide    New insurance     Was on trulicity, worked well  Ran out of money    Apparently the victoza might be covered          Objective    BP (!) 202/104 (BP Location: Left arm, Patient Position: Chair, Cuff Size: Adult Regular)   Pulse 80   Temp 98.4  F (36.9  C) (Temporal)   Ht 1.778 m (5' 10\")   Wt 123.1 kg (271 lb 6 oz)   SpO2 99%   BMI 38.94 kg/m    Body mass index is 38.94 kg/m .  Physical Exam  Constitutional:       Appearance: He is well-developed.   HENT:      Head: Normocephalic and atraumatic.   Eyes:      Conjunctiva/sclera: Conjunctivae normal.   Neck:      Vascular: No carotid bruit.   Cardiovascular:      Rate and Rhythm: Normal rate and regular rhythm.      Heart sounds: Normal heart sounds.   Pulmonary:      Effort: Pulmonary effort is normal. No respiratory " distress.      Breath sounds: Normal breath sounds.   Neurological:      Mental Status: He is alert and oriented to person, place, and time.      Cranial Nerves: No cranial nerve deficit.   Psychiatric:         Speech: Speech normal.         Behavior: Behavior normal.         foot exam normal bilat         ASSESSMENT / PLAN:  (E11.319) Type 2 diabetes mellitus with retinopathy, without long-term current use of insulin, macular edema presence unspecified, unspecified laterality, unspecified retinopathy severity (H)  (primary encounter diagnosis)  Comment: check labs.  Hemoglobin a1c likely to be high. Refill meds. Hopefully insurance will cover liraglutide or other glp med.  Plan: HEMOGLOBIN A1C, Albumin Random Urine         Quantitative with Creat Ratio, FOOT EXAM,         glipiZIDE (GLUCOTROL) 10 MG tablet, liraglutide        (VICTOZA) 18 MG/3ML solution, metFORMIN         (GLUCOPHAGE) 1000 MG tablet, AMB Adult Diabetes        Educator Referral, blood glucose monitoring (NO        BRAND SPECIFIED) meter device kit, blood         glucose (NO BRAND SPECIFIED) test strip, blood         glucose calibration (NO BRAND SPECIFIED)         solution, thin (NO BRAND SPECIFIED) lancets,         alcohol swab prep pads             (I10) Essential hypertension with goal blood pressure less than 140/90  Comment: refill meds ( has been out of some )  Plan: amLODIPine (NORVASC) 10 MG tablet,         lisinopril-hydrochlorothiazide (ZESTORETIC)         20-25 MG tablet        See us in 2-3 months, add more med if needed     (E78.5) Hyperlipidemia LDL goal <100  Comment: recheck lab, refill med  Plan: Lipid panel reflex to direct LDL Non-fasting,         simvastatin (ZOCOR) 20 MG tablet, Comprehensive        metabolic panel             (Z12.11) Screen for colon cancer  Comment: fit test   Plan: Fecal colorectal cancer screen (FIT)             (R53.83) Fatigue, unspecified type  Comment: check   Plan: TSH with free T4 reflex              (Z12.5) Screening for prostate cancer  Comment: psa   Plan: Prostate Specific Antigen Screen             (E66.01) Morbid obesity (H)  Comment: chronic   Plan: keep working on healthy diet/exercise and wt loss       I reviewed the patient's medications, allergies, medical history, family history, and social history.    Sean Dougherty MD                .  ..

## 2022-08-09 NOTE — PATIENT INSTRUCTIONS
See diabetes educator    Restart meds you have been out of    Keep working on healthy diet/exercise and wt loss    See us in a few months, recheck labs and blood pressure then

## 2022-08-09 NOTE — LETTER
September 8, 2022    Davie Banuelos  [address] [facility] [id number]          Dear ,    We are writing to inform you of your test results.    Stool test is normal.  No blood detected.       Resulted Orders   HEMOGLOBIN A1C   Result Value Ref Range    Hemoglobin A1C 11.8 (H) 0.0 - 5.6 %      Comment:      Normal <5.7%   Prediabetes 5.7-6.4%    Diabetes 6.5% or higher     Note: Adopted from ADA consensus guidelines.   Lipid panel reflex to direct LDL Non-fasting   Result Value Ref Range    Cholesterol 224 (H) <200 mg/dL    Triglycerides 333 (H) <150 mg/dL    Direct Measure HDL 48 >=40 mg/dL    LDL Cholesterol Calculated 109 (H) <=100 mg/dL    Non HDL Cholesterol 176 (H) <130 mg/dL    Patient Fasting > 8hrs? Yes     Narrative    Cholesterol  Desirable:  <200 mg/dL    Triglycerides  Normal:  Less than 150 mg/dL  Borderline High:  150-199 mg/dL  High:  200-499 mg/dL  Very High:  Greater than or equal to 500 mg/dL    Direct Measure HDL  Female:  Greater than or equal to 50 mg/dL   Male:  Greater than or equal to 40 mg/dL    LDL Cholesterol  Desirable:  <100mg/dL  Above Desirable:  100-129 mg/dL   Borderline High:  130-159 mg/dL   High:  160-189 mg/dL   Very High:  >= 190 mg/dL    Non HDL Cholesterol  Desirable:  130 mg/dL  Above Desirable:  130-159 mg/dL  Borderline High:  160-189 mg/dL  High:  190-219 mg/dL  Very High:  Greater than or equal to 220 mg/dL   Albumin Random Urine Quantitative with Creat Ratio   Result Value Ref Range    Creatinine Urine mg/dL 75 mg/dL    Albumin Urine mg/L 5,660 mg/L    Albumin Urine mg/g Cr 7,546.67 (H) 0.00 - 17.00 mg/g Cr   Fecal colorectal cancer screen (FIT)   Result Value Ref Range    Occult Blood Screen FIT Negative Negative   TSH with free T4 reflex   Result Value Ref Range    TSH 1.80 0.40 - 4.00 mU/L   Prostate Specific Antigen Screen   Result Value Ref Range    Prostate Specific Antigen Screen 0.29 0.00 - 4.00 ug/L   Comprehensive metabolic panel   Result  Value Ref Range    Sodium 135 133 - 144 mmol/L    Potassium 3.4 3.4 - 5.3 mmol/L    Chloride 101 94 - 109 mmol/L    Carbon Dioxide (CO2) 28 20 - 32 mmol/L    Anion Gap 6 3 - 14 mmol/L    Urea Nitrogen 10 7 - 30 mg/dL    Creatinine 0.92 0.66 - 1.25 mg/dL    Calcium 8.2 (L) 8.5 - 10.1 mg/dL    Glucose 327 (H) 70 - 99 mg/dL    Alkaline Phosphatase 66 40 - 150 U/L    AST 14 0 - 45 U/L    ALT 24 0 - 70 U/L    Protein Total 6.3 (L) 6.8 - 8.8 g/dL    Albumin 2.8 (L) 3.4 - 5.0 g/dL    Bilirubin Total 0.4 0.2 - 1.3 mg/dL    GFR Estimate >90 >60 mL/min/1.73m2      Comment:      Effective December 21, 2021 eGFRcr in adults is calculated using the 2021 CKD-EPI creatinine equation which includes age and gender (Mal et al., NEJM, DOI: 10.1056/ZWCWxx9306443)       If you have any questions or concerns, please call the clinic at the number listed above.       Sincerely,      Sean Dougherty MD

## 2022-08-10 ENCOUNTER — TELEPHONE (OUTPATIENT)
Dept: FAMILY MEDICINE | Facility: CLINIC | Age: 52
End: 2022-08-10

## 2022-08-10 NOTE — TELEPHONE ENCOUNTER
Diabetes Education Scheduling Outreach #1:    Call to patient to schedule. Left message with phone number to call to schedule.    Plan for 2nd outreach attempt within 1 week.    Yessenia Tony  [facility]  Diabetes and Nutrition Scheduling

## 2022-08-10 NOTE — RESULT ENCOUNTER NOTE
Unfortunately the overall diabetes test ( hemoglobin a1c ) is still very high.      Try to get on the trulicity or victoza type med; talk with pharmacist about this, as we did send in the prescription.    Also see diabetes educator.    You do have a lot  of protein in the urine.  Make sure you are taking the lisinopril.  We may want to consider having you see kidney specialist.    The blood test for kidney function ( creatinine ) is still normal.    Cholesterol moderately high.  Take simvastatin and keep working on healthy diet/exercise.     Other labs are okay.    See us in a 2-3 months.    Sean Dougherty MD

## 2022-08-11 ENCOUNTER — TELEPHONE (OUTPATIENT)
Dept: FAMILY MEDICINE | Facility: CLINIC | Age: 52
End: 2022-08-11

## 2022-08-11 DIAGNOSIS — E11.319 TYPE 2 DIABETES MELLITUS WITH RETINOPATHY, WITHOUT LONG-TERM CURRENT USE OF INSULIN, MACULAR EDEMA PRESENCE UNSPECIFIED, UNSPECIFIED LATERALITY, UNSPECIFIED RETINOPATHY SEVERITY (H): ICD-10-CM

## 2022-08-11 DIAGNOSIS — J01.90 ACUTE SINUSITIS WITH SYMPTOMS > 10 DAYS: Primary | ICD-10-CM

## 2022-08-11 NOTE — TELEPHONE ENCOUNTER
Needs needles for Victoza pen. Prescription sent.    Samantha PRATHER RN  [facility]

## 2022-08-12 ENCOUNTER — TRANSFERRED RECORDS (OUTPATIENT)
Dept: HEALTH INFORMATION MANAGEMENT | Facility: CLINIC | Age: 52
End: 2022-08-12

## 2022-08-12 LAB — RETINOPATHY: POSITIVE

## 2022-08-17 NOTE — TELEPHONE ENCOUNTER
Diabetes Education Scheduling Outreach #2:    Call to patient to schedule. Patient is sick with covid and will call back to schedule when he's ready.    [facility]  Diabetes and Nutrition Scheduling

## 2022-09-02 PROCEDURE — 82274 ASSAY TEST FOR BLOOD FECAL: CPT | Performed by: FAMILY MEDICINE

## 2022-09-03 LAB — HEMOCCULT STL QL IA: NEGATIVE

## 2022-11-29 DIAGNOSIS — I10 ESSENTIAL HYPERTENSION WITH GOAL BLOOD PRESSURE LESS THAN 140/90: ICD-10-CM

## 2022-11-30 RX ORDER — CARVEDILOL 25 MG/1
TABLET ORAL
Qty: 180 TABLET | Refills: 0 | Status: SHIPPED | OUTPATIENT
Start: 2022-11-30 | End: 2023-02-01

## 2022-11-30 NOTE — TELEPHONE ENCOUNTER
Okay refill but see us in clinic in the next couple months    Please inform patient    Sean Dougherty MD

## 2023-04-10 ENCOUNTER — NURSE TRIAGE (OUTPATIENT)
Dept: FAMILY MEDICINE | Facility: CLINIC | Age: 53
End: 2023-04-10
Payer: COMMERCIAL

## 2023-04-10 ENCOUNTER — TELEPHONE (OUTPATIENT)
Dept: FAMILY MEDICINE | Facility: CLINIC | Age: 53
End: 2023-04-10
Payer: COMMERCIAL

## 2023-04-10 DIAGNOSIS — J01.90 ACUTE SINUSITIS WITH SYMPTOMS > 10 DAYS: ICD-10-CM

## 2023-04-10 RX ORDER — AMOXICILLIN 500 MG/1
500 CAPSULE ORAL 3 TIMES DAILY
Qty: 30 CAPSULE | Refills: 0 | Status: SHIPPED | OUTPATIENT
Start: 2023-04-10 | End: 2023-05-22

## 2023-04-10 NOTE — TELEPHONE ENCOUNTER
Medication Question or Refill        What medication are you calling about (include dose and sig)?: Priyank needs antibiotic     Preferred Pharmacy      [id number] - [place], [place] - [address] [address] AT [facility] [address] [place]  [address] [address]  [place] [address]  Phone: [phone] Fax: [phone]      Controlled Substance Agreement on file:   CSA -- Patient Level:    CSA: None found at the patient level.              Patient offered an appointment? Yes: However girlfriend states you will refill without an apptment        Okay to leave a detailed message?: Yes at Home number on file [phone] (home) please call and let me know when meds are ready

## 2023-04-10 NOTE — TELEPHONE ENCOUNTER
Okayed prescription but then if symptoms not resolving needs to be seen    Please inform patient    Sean Dougherty MD

## 2023-04-10 NOTE — TELEPHONE ENCOUNTER
Patient last prescribed Amoxicillin 500 mg on 3/5/2021 for sinus infection.    Spoke with patient's partner, Alda. No consent to communicate on file, no PHI given during this phone call.     She describes patient currently has a sinus infection. He has been experiencing green discharge from nose and has been coughing up green mucous. He has also been experiencing congestion in his chest.     He has been coughing for the past 2-3 days, and has been experiencing discharge from his nose for the past week. He has been experiencing sinus pain and pressure as well.     Patient does not have a fever, earache, or difficulty breathing.     Advised patient should be seen in office today or tomorrow to be further evaluated. Patient declining appointment at this time, requesting Amoxicillin to be sent to pharmacy.     Please advise.    TARI Arriaga RN  [facility]    Additional Information    Negative: Sounds like a life-threatening emergency to the triager    Negative: Difficulty breathing, and not from stuffy nose (e.g., not relieved by cleaning out the nose)    Negative: SEVERE headache and has fever    Negative: Patient sounds very sick or weak to the triager    Negative: SEVERE sinus pain    Negative: Severe headache    Negative: Redness or swelling on the cheek, forehead, or around the eye    Negative: Fever > 103 F (39.4 C)    Negative: Fever > 101 F (38.3 C) and over 60 years of age    Negative: Fever > 100.0 F (37.8 C) and has diabetes mellitus or a weak immune system (e.g., HIV positive, cancer chemotherapy, organ transplant, splenectomy, chronic steroids)    Negative: Fever > 100.0 F (37.8 C) and bedridden (e.g., nursing home patient, stroke, chronic illness, recovering from surgery)    Negative: Fever present > 3 days (72 hours)    Negative: Fever returns after gone for over 24 hours and symptoms worse or not improved    Negative: Sinus pain (not just congestion) and fever    Negative:  Earache    Negative: Sinus congestion (pressure, fullness) present > 10 days    Negative: Nasal discharge present > 10 days    Negative: Using nasal washes and pain medicine > 24 hours and sinus pain (lower forehead, cheekbone, or eye) persists    Lots of coughing    Protocols used: SINUS PAIN AND CONGESTION-A-OH

## 2023-04-10 NOTE — TELEPHONE ENCOUNTER
Do not see recent antibiotic to refill. Will forward to Triage.     Gris Saldana -    [facility]

## 2023-04-10 NOTE — TELEPHONE ENCOUNTER
Called and relayed message.    Diandra Diaz RN   [facility]

## 2023-05-22 ENCOUNTER — OFFICE VISIT (OUTPATIENT)
Dept: FAMILY MEDICINE | Facility: CLINIC | Age: 53
End: 2023-05-22
Payer: COMMERCIAL

## 2023-05-22 VITALS
TEMPERATURE: 98.5 F | HEIGHT: 70 IN | DIASTOLIC BLOOD PRESSURE: 96 MMHG | WEIGHT: 282.25 LBS | RESPIRATION RATE: 16 BRPM | BODY MASS INDEX: 40.41 KG/M2 | OXYGEN SATURATION: 100 % | HEART RATE: 88 BPM | SYSTOLIC BLOOD PRESSURE: 174 MMHG

## 2023-05-22 DIAGNOSIS — Z00.00 ROUTINE GENERAL MEDICAL EXAMINATION AT A HEALTH CARE FACILITY: Primary | ICD-10-CM

## 2023-05-22 DIAGNOSIS — E78.5 HYPERLIPIDEMIA LDL GOAL <100: ICD-10-CM

## 2023-05-22 DIAGNOSIS — M79.10 MUSCLE PAIN: ICD-10-CM

## 2023-05-22 DIAGNOSIS — R80.9 PROTEINURIA, UNSPECIFIED TYPE: ICD-10-CM

## 2023-05-22 DIAGNOSIS — E66.01 MORBID OBESITY (H): ICD-10-CM

## 2023-05-22 DIAGNOSIS — R53.83 FATIGUE, UNSPECIFIED TYPE: ICD-10-CM

## 2023-05-22 DIAGNOSIS — I10 SEVERE HYPERTENSION: ICD-10-CM

## 2023-05-22 DIAGNOSIS — I10 ESSENTIAL HYPERTENSION WITH GOAL BLOOD PRESSURE LESS THAN 140/90: ICD-10-CM

## 2023-05-22 DIAGNOSIS — E11.319 TYPE 2 DIABETES MELLITUS WITH RETINOPATHY, WITHOUT LONG-TERM CURRENT USE OF INSULIN, MACULAR EDEMA PRESENCE UNSPECIFIED, UNSPECIFIED LATERALITY, UNSPECIFIED RETINOPATHY SEVERITY (H): ICD-10-CM

## 2023-05-22 LAB
ALBUMIN SERPL BCG-MCNC: 3.7 G/DL (ref 3.5–5.2)
ALP SERPL-CCNC: 70 U/L (ref 40–129)
ALT SERPL W P-5'-P-CCNC: 14 U/L (ref 10–50)
ANION GAP SERPL CALCULATED.3IONS-SCNC: 13 MMOL/L (ref 7–15)
AST SERPL W P-5'-P-CCNC: 20 U/L (ref 10–50)
BASOPHILS # BLD AUTO: 0.1 10E3/UL (ref 0–0.2)
BASOPHILS NFR BLD AUTO: 1 %
BILIRUB SERPL-MCNC: 0.3 MG/DL
BUN SERPL-MCNC: 19.4 MG/DL (ref 6–20)
CALCIUM SERPL-MCNC: 9 MG/DL (ref 8.6–10)
CHLORIDE SERPL-SCNC: 99 MMOL/L (ref 98–107)
CHOLEST SERPL-MCNC: 190 MG/DL
CK SERPL-CCNC: 254 U/L (ref 39–308)
CREAT SERPL-MCNC: 1.26 MG/DL (ref 0.67–1.17)
CREAT UR-MCNC: 57.7 MG/DL
DEPRECATED HCO3 PLAS-SCNC: 25 MMOL/L (ref 22–29)
EOSINOPHIL # BLD AUTO: 0.3 10E3/UL (ref 0–0.7)
EOSINOPHIL NFR BLD AUTO: 3 %
ERYTHROCYTE [DISTWIDTH] IN BLOOD BY AUTOMATED COUNT: 12.8 % (ref 10–15)
GFR SERPL CREATININE-BSD FRML MDRD: 68 ML/MIN/1.73M2
GLUCOSE SERPL-MCNC: 230 MG/DL (ref 70–99)
HBA1C MFR BLD: 10.3 % (ref 0–5.6)
HCT VFR BLD AUTO: 35.2 % (ref 40–53)
HDLC SERPL-MCNC: 37 MG/DL
HGB BLD-MCNC: 12.4 G/DL (ref 13.3–17.7)
LDLC SERPL CALC-MCNC: 103 MG/DL
LYMPHOCYTES # BLD AUTO: 1.3 10E3/UL (ref 0.8–5.3)
LYMPHOCYTES NFR BLD AUTO: 14 %
MAGNESIUM SERPL-MCNC: 1.8 MG/DL (ref 1.7–2.3)
MCH RBC QN AUTO: 29.8 PG (ref 26.5–33)
MCHC RBC AUTO-ENTMCNC: 35.2 G/DL (ref 31.5–36.5)
MCV RBC AUTO: 85 FL (ref 78–100)
MICROALBUMIN UR-MCNC: 3500 MG/L
MICROALBUMIN/CREAT UR: 6065.86 MG/G CR (ref 0–17)
MONOCYTES # BLD AUTO: 0.8 10E3/UL (ref 0–1.3)
MONOCYTES NFR BLD AUTO: 9 %
NEUTROPHILS # BLD AUTO: 6.9 10E3/UL (ref 1.6–8.3)
NEUTROPHILS NFR BLD AUTO: 74 %
NONHDLC SERPL-MCNC: 153 MG/DL
PLATELET # BLD AUTO: 243 10E3/UL (ref 150–450)
POTASSIUM SERPL-SCNC: 3.5 MMOL/L (ref 3.4–5.3)
PROT SERPL-MCNC: 6.9 G/DL (ref 6.4–8.3)
RBC # BLD AUTO: 4.16 10E6/UL (ref 4.4–5.9)
SODIUM SERPL-SCNC: 137 MMOL/L (ref 136–145)
TRIGL SERPL-MCNC: 248 MG/DL
WBC # BLD AUTO: 9.3 10E3/UL (ref 4–11)

## 2023-05-22 PROCEDURE — 80061 LIPID PANEL: CPT | Performed by: FAMILY MEDICINE

## 2023-05-22 PROCEDURE — 83036 HEMOGLOBIN GLYCOSYLATED A1C: CPT | Performed by: FAMILY MEDICINE

## 2023-05-22 PROCEDURE — 82550 ASSAY OF CK (CPK): CPT | Performed by: FAMILY MEDICINE

## 2023-05-22 PROCEDURE — 99396 PREV VISIT EST AGE 40-64: CPT | Performed by: FAMILY MEDICINE

## 2023-05-22 PROCEDURE — 83735 ASSAY OF MAGNESIUM: CPT | Performed by: FAMILY MEDICINE

## 2023-05-22 PROCEDURE — 85025 COMPLETE CBC W/AUTO DIFF WBC: CPT | Performed by: FAMILY MEDICINE

## 2023-05-22 PROCEDURE — 36415 COLL VENOUS BLD VENIPUNCTURE: CPT | Performed by: FAMILY MEDICINE

## 2023-05-22 PROCEDURE — 82570 ASSAY OF URINE CREATININE: CPT | Performed by: FAMILY MEDICINE

## 2023-05-22 PROCEDURE — 99213 OFFICE O/P EST LOW 20 MIN: CPT | Mod: 25 | Performed by: FAMILY MEDICINE

## 2023-05-22 PROCEDURE — 80053 COMPREHEN METABOLIC PANEL: CPT | Performed by: FAMILY MEDICINE

## 2023-05-22 PROCEDURE — 82043 UR ALBUMIN QUANTITATIVE: CPT | Performed by: FAMILY MEDICINE

## 2023-05-22 RX ORDER — CARVEDILOL 25 MG/1
25 TABLET ORAL 2 TIMES DAILY WITH MEALS
Qty: 180 TABLET | Refills: 1 | Status: SHIPPED | OUTPATIENT
Start: 2023-05-22 | End: 2023-12-15

## 2023-05-22 RX ORDER — LISINOPRIL AND HYDROCHLOROTHIAZIDE 20; 25 MG/1; MG/1
2 TABLET ORAL DAILY
Qty: 180 TABLET | Refills: 1 | Status: SHIPPED | OUTPATIENT
Start: 2023-05-22 | End: 2023-07-05

## 2023-05-22 RX ORDER — GLIPIZIDE 10 MG/1
TABLET ORAL
Qty: 180 TABLET | Refills: 1 | Status: SHIPPED | OUTPATIENT
Start: 2023-05-22 | End: 2023-12-15

## 2023-05-22 RX ORDER — SIMVASTATIN 20 MG
20 TABLET ORAL AT BEDTIME
Qty: 90 TABLET | Refills: 3 | Status: SHIPPED | OUTPATIENT
Start: 2023-05-22 | End: 2023-07-05

## 2023-05-22 RX ORDER — AMLODIPINE BESYLATE 10 MG/1
10 TABLET ORAL DAILY
Qty: 90 TABLET | Refills: 3 | Status: SHIPPED | OUTPATIENT
Start: 2023-05-22 | End: 2023-12-15

## 2023-05-22 ASSESSMENT — ENCOUNTER SYMPTOMS
DIARRHEA: 0
SORE THROAT: 0
ARTHRALGIAS: 0
FREQUENCY: 0
HEARTBURN: 0
ABDOMINAL PAIN: 0
EYE PAIN: 0
COUGH: 0
HEMATURIA: 0
CHILLS: 0
WEAKNESS: 0
JOINT SWELLING: 0
MYALGIAS: 1
DIZZINESS: 0
HEADACHES: 0
PARESTHESIAS: 0
SHORTNESS OF BREATH: 0
DYSURIA: 0
FEVER: 0
PALPITATIONS: 0
CONSTIPATION: 0
NAUSEA: 0
NERVOUS/ANXIOUS: 0
HEMATOCHEZIA: 0

## 2023-05-22 ASSESSMENT — PAIN SCALES - GENERAL: PAINLEVEL: NO PAIN (0)

## 2023-05-22 NOTE — PATIENT INSTRUCTIONS
Keep working on healthy diet/exercise and weight loss    Low sodium diet    We will send you lab results    See us in 3 months, sooner if needed     Stay on same meds for now    Get shingles shot at pharmacy           Preventive Health Recommendations  Male Ages 50 - 64    Yearly exam:             See your health care provider every year in order to  o   Review health changes.   o   Discuss preventive care.    o   Review your medicines if your doctor has prescribed any.   Have a cholesterol test every 5 years, or more frequently if you are at risk for high cholesterol/heart disease.   Have a diabetes test (fasting glucose) every three years. If you are at risk for diabetes, you should have this test more often.   Have a colonoscopy at age 50, or have a yearly FIT test (stool test). These exams will check for colon cancer.    Talk with your health care provider about whether or not a prostate cancer screening test (PSA) is right for you.  You should be tested each year for STDs (sexually transmitted diseases), if you re at risk.     Shots: Get a flu shot each year. Get a tetanus shot every 10 years.     Nutrition:  Eat at least 5 servings of fruits and vegetables daily.   Eat whole-grain bread, whole-wheat pasta and brown rice instead of white grains and rice.   Get adequate Calcium and Vitamin D.     Lifestyle  Exercise for at least 150 minutes a week (30 minutes a day, 5 days a week). This will help you control your weight and prevent disease.   Limit alcohol to one drink per day.   No smoking.   Wear sunscreen to prevent skin cancer.   See your dentist every six months for an exam and cleaning.   See your eye doctor every 1 to 2 years.

## 2023-05-22 NOTE — LETTER
May 23, 2023    Davie Banuelos  [address] [address] [address] [facility] [address]          Dear ,    We are writing to inform you of your test results.  You have  a lot of protein in the urine, more than last time.  We should have you see the kidney specialist ( nephrology ).  I did a referral for this.  Call [phone] to schedule.     The blood test for kidney function ( creatinine ) is also higher.     The red blood count ( hemoglobin ) is low.  This may be related to the kidney.     Diabetes test ( hemoglobin a1c ) still very high.  Stay on same medications, keep working on healthy diet/exercise and weight loss, then see us in 3 months.     Also schedule the kidney ultrasound.     Resulted Orders   Albumin Random Urine Quantitative with Creat Ratio   Result Value Ref Range    Creatinine Urine mg/dL 57.7 mg/dL      Comment:      The reference ranges have not been established in urine creatinine. The results should be integrated into the clinical context for interpretation.    Albumin Urine mg/L 3,500.0 mg/L      Comment:      The reference ranges have not been established in urine albumin. The results should be integrated into the clinical context for interpretation.    Albumin Urine mg/g Cr 6,065.86 (H) 0.00 - 17.00 mg/g Cr      Comment:      Microalbuminuria is defined as an albumin:creatinine ratio of 17 to 299 for males and 25 to 299 for females. A ratio of albumin:creatinine of 300 or higher is indicative of overt proteinuria.  Due to biologic variability, positive results should be confirmed by a second, first-morning random or 24-hour timed urine specimen. If there is discrepancy, a third specimen is recommended. When 2 out of 3 results are in the microalbuminuria range, this is evidence for incipient nephropathy and warrants increased efforts at glucose control, blood pressure control, and institution of therapy with an angiotensin-converting-enzyme (ACE) inhibitor (if the patient can tolerate it).      Comprehensive metabolic panel   Result Value Ref Range    Sodium 137 136 - 145 mmol/L    Potassium 3.5 3.4 - 5.3 mmol/L    Chloride 99 98 - 107 mmol/L    Carbon Dioxide (CO2) 25 22 - 29 mmol/L    Anion Gap 13 7 - 15 mmol/L    Urea Nitrogen 19.4 6.0 - 20.0 mg/dL    Creatinine 1.26 (H) 0.67 - 1.17 mg/dL    Calcium 9.0 8.6 - 10.0 mg/dL    Glucose 230 (H) 70 - 99 mg/dL    Alkaline Phosphatase 70 40 - 129 U/L    AST 20 10 - 50 U/L    ALT 14 10 - 50 U/L    Protein Total 6.9 6.4 - 8.3 g/dL    Albumin 3.7 3.5 - 5.2 g/dL    Bilirubin Total 0.3 <=1.2 mg/dL    GFR Estimate 68 >60 mL/min/1.73m2      Comment:      eGFR calculated using 2021 CKD-EPI equation.   Lipid panel reflex to direct LDL Fasting   Result Value Ref Range    Cholesterol 190 <200 mg/dL    Triglycerides 248 (H) <150 mg/dL    Direct Measure HDL 37 (L) >=40 mg/dL    LDL Cholesterol Calculated 103 (H) <=100 mg/dL    Non HDL Cholesterol 153 (H) <130 mg/dL    Narrative    Cholesterol  Desirable:  <200 mg/dL    Triglycerides  Normal:  Less than 150 mg/dL  Borderline High:  150-199 mg/dL  High:  200-499 mg/dL  Very High:  Greater than or equal to 500 mg/dL    Direct Measure HDL  Female:  Greater than or equal to 50 mg/dL   Male:  Greater than or equal to 40 mg/dL    LDL Cholesterol  Desirable:  <100mg/dL  Above Desirable:  100-129 mg/dL   Borderline High:  130-159 mg/dL   High:  160-189 mg/dL   Very High:  >= 190 mg/dL    Non HDL Cholesterol  Desirable:  130 mg/dL  Above Desirable:  130-159 mg/dL  Borderline High:  160-189 mg/dL  High:  190-219 mg/dL  Very High:  Greater than or equal to 220 mg/dL   HEMOGLOBIN A1C   Result Value Ref Range    Hemoglobin A1C 10.3 (H) 0.0 - 5.6 %      Comment:      Normal <5.7%   Prediabetes 5.7-6.4%    Diabetes 6.5% or higher     Note: Adopted from ADA consensus guidelines.   CBC with platelets and differential   Result Value Ref Range    WBC Count 9.3 4.0 - 11.0 10e3/uL    RBC Count 4.16 (L) 4.40 - 5.90 10e6/uL    Hemoglobin 12.4  (L) 13.3 - 17.7 g/dL    Hematocrit 35.2 (L) 40.0 - 53.0 %    MCV 85 78 - 100 fL    MCH 29.8 26.5 - 33.0 pg    MCHC 35.2 31.5 - 36.5 g/dL    RDW 12.8 10.0 - 15.0 %    Platelet Count 243 150 - 450 10e3/uL    % Neutrophils 74 %    % Lymphocytes 14 %    % Monocytes 9 %    % Eosinophils 3 %    % Basophils 1 %    Absolute Neutrophils 6.9 1.6 - 8.3 10e3/uL    Absolute Lymphocytes 1.3 0.8 - 5.3 10e3/uL    Absolute Monocytes 0.8 0.0 - 1.3 10e3/uL    Absolute Eosinophils 0.3 0.0 - 0.7 10e3/uL    Absolute Basophils 0.1 0.0 - 0.2 10e3/uL   CK total   Result Value Ref Range     39 - 308 U/L   Magnesium   Result Value Ref Range    Magnesium 1.8 1.7 - 2.3 mg/dL       If you have any questions or concerns, please call the clinic at the number listed above.       Sincerely,      Sean Dougherty MD

## 2023-05-22 NOTE — PROGRESS NOTES
SUBJECTIVE:   CC: Davie is an 52 year old who presents for preventative health visit.       5/22/2023     6:55 AM   Additional Questions   Roomed by Marisela Milligan      Patient has been advised of split billing requirements and indicates understanding: Yes  Healthy Habits:     Getting at least 3 servings of Calcium per day:  Yes    Bi-annual eye exam:  Yes    Dental care twice a year:  NO    Sleep apnea or symptoms of sleep apnea:  Excessive snoring    Diet:  Low salt and Diabetic    Frequency of exercise:  4-5 days/week    Duration of exercise:  30-45 minutes    Taking medications regularly:  Yes    Medication side effects:  None    PHQ-2 Total Score: 0    Additional concerns today:  No                   Today's PHQ-2 Score:       5/22/2023     6:47 AM   PHQ-2 ( 1999 [employer])   Q1: Little interest or pleasure in doing things 0   Q2: Feeling down, depressed or hopeless 0   PHQ-2 Score 0   Q1: Little interest or pleasure in doing things Not at all   Q2: Feeling down, depressed or hopeless Not at all   PHQ-2 Score 0           Social History     Tobacco Use     Smoking status: Never     Smokeless tobacco: Never   Vaping Use     Vaping status: Never Used   Substance Use Topics     Alcohol use: No              5/22/2023     6:47 AM   Alcohol Use   Prescreen: >3 drinks/day or >7 drinks/week? No          View : No data to display.                Last PSA:   PSA   Date Value Ref Range Status   02/15/2021 0.31 0 - 4 ug/L Final     Comment:     Assay Method:  Chemiluminescence using Siemens Vista analyzer     Prostate Specific Antigen Screen   Date Value Ref Range Status   08/09/2022 0.29 0.00 - 4.00 ug/L Final       Reviewed orders with patient. Reviewed health maintenance and updated orders accordingly - Yes       Reviewed and updated as needed this visit by clinical staff   Tobacco  Allergies  Meds              Reviewed and updated as needed this visit by Provider                     Review of Systems   Constitutional:  "Negative for chills and fever.   HENT: Positive for congestion. Negative for ear pain, hearing loss and sore throat.    Eyes: Negative for pain and visual disturbance.   Respiratory: Negative for cough and shortness of breath.    Cardiovascular: Negative for chest pain, palpitations and peripheral edema.   Gastrointestinal: Negative for abdominal pain, constipation, diarrhea, heartburn, hematochezia and nausea.   Genitourinary: Negative for dysuria, frequency, genital sores, hematuria, impotence, penile discharge and urgency.   Musculoskeletal: Positive for myalgias. Negative for arthralgias and joint swelling.   Skin: Negative for rash.   Neurological: Negative for dizziness, weakness, headaches and paresthesias.   Psychiatric/Behavioral: Negative for mood changes. The patient is not nervous/anxious.       feeling good    Sugars mostly mid 100s     On victoza, no  Side effects   Occasionally bend neeedle    Blood pressure often high systolic     4-5 cans diet coke daily    Very little alcohol    Water     Walk 4-5 miles per day in summer    Active at work    Off and on on the diet    Legs lock up in am sometimes, but once gets going  Fine    Had tetanus through work a year ago      OBJECTIVE:   BP (!) 202/106 (BP Location: Left arm, Patient Position: Chair, Cuff Size: Adult Large)   Pulse 88   Temp 98.5  F (36.9  C) (Temporal)   Resp 16   Ht 1.778 m (5' 10\")   Wt 128 kg (282 lb 4 oz)   SpO2 100%   BMI 40.50 kg/m      Physical Exam  GENERAL: healthy, alert and no distress  EYES: Eyes grossly normal to inspection, PERRL and conjunctivae and sclerae normal  HENT: ear canals and TM's normal, nose and mouth without ulcers or lesions  NECK: no adenopathy, no asymmetry, masses, or scars and thyroid normal to palpation  RESP: lungs clear to auscultation - no rales, rhonchi or wheezes  CV: regular rate and rhythm, normal S1 S2, no S3 or S4, no murmur, click or rub, no peripheral edema and peripheral pulses " strong  ABDOMEN: soft, nontender, no hepatosplenomegaly, no masses and bowel sounds normal  MS: no gross musculoskeletal defects noted, no edema  SKIN: no suspicious lesions or rashes  NEURO: Normal strength and tone, mentation intact and speech normal  PSYCH: mentation appears normal, affect normal/bright  Diabetic foot exam: normal bilat    Diagnostic Test Results:  Labs reviewed in Epic    ASSESSMENT/PLAN:   Davie was seen today for physical.    Diagnoses and all orders for this visit:    Routine general medical examination at a health care facility    Type 2 diabetes mellitus with retinopathy, without long-term current use of insulin, macular edema presence unspecified, unspecified laterality, unspecified retinopathy severity (H)  -     HEMOGLOBIN A1C; Future  -     glipiZIDE (GLUCOTROL) 10 MG tablet; TAKE 1 TABLET BY MOUTH TWICE DAILY BEFORE A MEAL  -     metFORMIN (GLUCOPHAGE) 1000 MG tablet; TAKE 1 TABLET BY MOUTH TWICE DAILY  -     HEMOGLOBIN A1C    Essential hypertension with goal blood pressure less than 140/90  -     amLODIPine (NORVASC) 10 MG tablet; Take 1 tablet (10 mg) by mouth daily  -     carvedilol (COREG) 25 MG tablet; Take 1 tablet (25 mg) by mouth 2 times daily (with meals)  -     lisinopril-hydrochlorothiazide (ZESTORETIC) 20-25 MG tablet; Take 2 tablets by mouth daily    Hyperlipidemia LDL goal <100  -     simvastatin (ZOCOR) 20 MG tablet; Take 1 tablet (20 mg) by mouth At Bedtime  -     Lipid panel reflex to direct LDL Fasting; Future  -     Comprehensive metabolic panel; Future  -     Comprehensive metabolic panel  -     Lipid panel reflex to direct LDL Fasting    Severe hypertension  -     US Renal Complete w Arterial Duplex; Future    Fatigue, unspecified type  -     CBC with Platelets & Differential; Future  -     CBC with Platelets & Differential    Proteinuria, unspecified type  -     Albumin Random Urine Quantitative with Creat Ratio; Future  -     Albumin Random Urine Quantitative  "with Creat Ratio    Muscle pain  -     CK total; Future  -     Magnesium; Future    Morbid obesity (H)    Discussed multiple issues  Up to date on colon screening, did fit last year  No std concern  Can get shingles shot at pharmacy  He hat tetanus last year through work  Check multiple labs  Hemoglobin a1c should be better  Blood pressure still very high; patient to schedule renal / renal artery ultrasound to look for stenosis  No cardiac symptoms   Wt is concerning  Keep working on healthy diet/exercise and wt loss  Low sodium diet  See us in 3 months     Patient has been advised of split billing requirements and indicates understanding: Yes      COUNSELING:   Reviewed preventive health counseling, as reflected in patient instructions       Regular exercise       Healthy diet/nutrition       Vision screening       Safe sex practices/STD prevention       Colorectal cancer screening       Prostate cancer screening      BMI:   Estimated body mass index is 40.5 kg/m  as calculated from the following:    Height as of this encounter: 1.778 m (5' 10\").    Weight as of this encounter: 128 kg (282 lb 4 oz).   Weight management plan: Discussed healthy diet and exercise guidelines      He reports that he has never smoked. He has never used smokeless tobacco.         Sean Dougherty MD  [facility]  "

## 2023-05-23 ENCOUNTER — TELEPHONE (OUTPATIENT)
Dept: NEPHROLOGY | Facility: CLINIC | Age: 53
End: 2023-05-23
Payer: COMMERCIAL

## 2023-05-23 DIAGNOSIS — I10 ESSENTIAL HYPERTENSION WITH GOAL BLOOD PRESSURE LESS THAN 140/90: Primary | ICD-10-CM

## 2023-05-23 DIAGNOSIS — R80.9 PROTEINURIA, UNSPECIFIED TYPE: Primary | ICD-10-CM

## 2023-05-23 NOTE — RESULT ENCOUNTER NOTE
You have  a lot of protein in the urine, more than last time.  We should have you see the kidney specialist ( nephrology ).  I did a referral for this.  Call [phone] to schedule.    The blood test for kidney function ( creatinine ) is also higher.    The red blood count ( hemoglobin ) is low.  This may be related to the kidney.    Diabetes test ( hemoglobin a1c ) still very high.  Stay on same medications, keep working on healthy diet/exercise and weight loss, then see us in 3 months.     Also schedule the kidney ultrasound.    Sean Dougherty MD

## 2023-05-23 NOTE — TELEPHONE ENCOUNTER
[facility] Call Center    Phone Message    May a detailed message be left on voicemail: yes     Reason for Call: Order(s): Other:   Reason for requested: New Neph  Date needed: 6/28/23  Provider name: Ray Jesus MD      Action Taken: Message routed to:  [facility] ([facility]): Neph    Travel Screening: Not Applicable

## 2023-05-23 NOTE — CONFIDENTIAL NOTE
DIAGNOSIS:  Proteinuria, unspecified type   DATE RECEIVED: 07.05.2023   NOTES STATUS DETAILS   OFFICE NOTE from referring provider Internal 05.23.2023 Sean Dougherty MD   OFFICE NOTE from other specialist      *Only VASCULITIS or LUPUS gather office notes for the following     *PULMONARY       *ENT     *DERMATOLOGY     *RHEUMATOLOGY     DISCHARGE SUMMARY from hospital     DISCHARGE REPORT from the ER     MEDICATION LIST Internal    IMAGING  (NEED IMAGES AND REPORTS)     KIDNEY CT SCAN     KIDNEY ULTRASOUND     MR ABDOMEN     NUCLEAR MEDICINE RENAL     LABS     CBC Internal 05.22.2023   CMP Internal 05.22.2023   BMP     UA     URINE PROTEIN     RENAL PANEL     BIOPSY     KIDNEY BIOPSY

## 2023-07-05 ENCOUNTER — PRE VISIT (OUTPATIENT)
Dept: NEPHROLOGY | Facility: CLINIC | Age: 53
End: 2023-07-05

## 2023-07-05 ENCOUNTER — OFFICE VISIT (OUTPATIENT)
Dept: NEPHROLOGY | Facility: CLINIC | Age: 53
End: 2023-07-05
Attending: FAMILY MEDICINE
Payer: COMMERCIAL

## 2023-07-05 ENCOUNTER — LAB (OUTPATIENT)
Dept: LAB | Facility: CLINIC | Age: 53
End: 2023-07-05
Attending: INTERNAL MEDICINE
Payer: COMMERCIAL

## 2023-07-05 VITALS
WEIGHT: 277.4 LBS | TEMPERATURE: 98.5 F | DIASTOLIC BLOOD PRESSURE: 109 MMHG | HEART RATE: 88 BPM | SYSTOLIC BLOOD PRESSURE: 207 MMHG | OXYGEN SATURATION: 98 % | BODY MASS INDEX: 39.8 KG/M2

## 2023-07-05 DIAGNOSIS — N18.1 TYPE 2 DIABETES MELLITUS WITH STAGE 1 CHRONIC KIDNEY DISEASE, WITH LONG-TERM CURRENT USE OF INSULIN (H): ICD-10-CM

## 2023-07-05 DIAGNOSIS — Z79.4 TYPE 2 DIABETES MELLITUS WITH STAGE 1 CHRONIC KIDNEY DISEASE, WITH LONG-TERM CURRENT USE OF INSULIN (H): ICD-10-CM

## 2023-07-05 DIAGNOSIS — E11.22 TYPE 2 DIABETES MELLITUS WITH STAGE 1 CHRONIC KIDNEY DISEASE, WITH LONG-TERM CURRENT USE OF INSULIN (H): ICD-10-CM

## 2023-07-05 DIAGNOSIS — E78.5 HYPERLIPIDEMIA LDL GOAL <100: ICD-10-CM

## 2023-07-05 DIAGNOSIS — I12.9 HYPERTENSION, RENAL: Primary | ICD-10-CM

## 2023-07-05 DIAGNOSIS — R80.9 PROTEINURIA, UNSPECIFIED TYPE: ICD-10-CM

## 2023-07-05 DIAGNOSIS — I10 ESSENTIAL HYPERTENSION WITH GOAL BLOOD PRESSURE LESS THAN 140/90: ICD-10-CM

## 2023-07-05 LAB
ALBUMIN MFR UR ELPH: 545 MG/DL
ALBUMIN SERPL BCG-MCNC: 3.7 G/DL (ref 3.5–5.2)
ALBUMIN UR-MCNC: 600 MG/DL
ANION GAP SERPL CALCULATED.3IONS-SCNC: 12 MMOL/L (ref 7–15)
APPEARANCE UR: CLEAR
BILIRUB UR QL STRIP: NEGATIVE
BUN SERPL-MCNC: 17.5 MG/DL (ref 6–20)
CALCIUM SERPL-MCNC: 9.1 MG/DL (ref 8.6–10)
CHLORIDE SERPL-SCNC: 98 MMOL/L (ref 98–107)
COLOR UR AUTO: ABNORMAL
CREAT SERPL-MCNC: 1.37 MG/DL (ref 0.67–1.17)
CREAT UR-MCNC: 79.7 MG/DL
DEPRECATED HCO3 PLAS-SCNC: 26 MMOL/L (ref 22–29)
ERYTHROCYTE [DISTWIDTH] IN BLOOD BY AUTOMATED COUNT: 12.6 % (ref 10–15)
FERRITIN SERPL-MCNC: 106 NG/ML (ref 31–409)
GFR SERPL CREATININE-BSD FRML MDRD: 61 ML/MIN/1.73M2
GLUCOSE SERPL-MCNC: 284 MG/DL (ref 70–99)
GLUCOSE UR STRIP-MCNC: 200 MG/DL
HCT VFR BLD AUTO: 35 % (ref 40–53)
HGB BLD-MCNC: 12.1 G/DL (ref 13.3–17.7)
HGB UR QL STRIP: ABNORMAL
HYALINE CASTS: 5 /LPF
IRON BINDING CAPACITY (ROCHE): 270 UG/DL (ref 240–430)
IRON SATN MFR SERPL: 21 % (ref 15–46)
IRON SERPL-MCNC: 56 UG/DL (ref 61–157)
KETONES UR STRIP-MCNC: NEGATIVE MG/DL
LEUKOCYTE ESTERASE UR QL STRIP: NEGATIVE
MCH RBC QN AUTO: 28.9 PG (ref 26.5–33)
MCHC RBC AUTO-ENTMCNC: 34.6 G/DL (ref 31.5–36.5)
MCV RBC AUTO: 84 FL (ref 78–100)
MUCOUS THREADS #/AREA URNS LPF: PRESENT /LPF
NITRATE UR QL: NEGATIVE
PH UR STRIP: 6.5 [PH] (ref 5–7)
PHOSPHATE SERPL-MCNC: 3.4 MG/DL (ref 2.5–4.5)
PLATELET # BLD AUTO: 240 10E3/UL (ref 150–450)
POTASSIUM SERPL-SCNC: 3.3 MMOL/L (ref 3.4–5.3)
PROT/CREAT 24H UR: 6.84 MG/MG CR (ref 0–0.2)
PTH-INTACT SERPL-MCNC: 26 PG/ML (ref 15–65)
RBC # BLD AUTO: 4.19 10E6/UL (ref 4.4–5.9)
RBC URINE: 17 /HPF
SODIUM SERPL-SCNC: 136 MMOL/L (ref 136–145)
SP GR UR STRIP: 1.01 (ref 1–1.03)
SQUAMOUS EPITHELIAL: 1 /HPF
UROBILINOGEN UR STRIP-MCNC: NORMAL MG/DL
WBC # BLD AUTO: 8 10E3/UL (ref 4–11)
WBC URINE: 1 /HPF

## 2023-07-05 PROCEDURE — 86036 ANCA SCREEN EACH ANTIBODY: CPT | Performed by: INTERNAL MEDICINE

## 2023-07-05 PROCEDURE — 99205 OFFICE O/P NEW HI 60 MIN: CPT | Performed by: INTERNAL MEDICINE

## 2023-07-05 PROCEDURE — 85027 COMPLETE CBC AUTOMATED: CPT | Performed by: PATHOLOGY

## 2023-07-05 PROCEDURE — 81001 URINALYSIS AUTO W/SCOPE: CPT | Performed by: PATHOLOGY

## 2023-07-05 PROCEDURE — 86803 HEPATITIS C AB TEST: CPT | Performed by: INTERNAL MEDICINE

## 2023-07-05 PROCEDURE — 86160 COMPLEMENT ANTIGEN: CPT | Performed by: INTERNAL MEDICINE

## 2023-07-05 PROCEDURE — 87389 HIV-1 AG W/HIV-1&-2 AB AG IA: CPT | Performed by: INTERNAL MEDICINE

## 2023-07-05 PROCEDURE — 84156 ASSAY OF PROTEIN URINE: CPT | Performed by: PATHOLOGY

## 2023-07-05 PROCEDURE — 86225 DNA ANTIBODY NATIVE: CPT | Performed by: INTERNAL MEDICINE

## 2023-07-05 PROCEDURE — 80069 RENAL FUNCTION PANEL: CPT | Performed by: PATHOLOGY

## 2023-07-05 PROCEDURE — 83876 ASSAY MYELOPEROXIDASE: CPT | Performed by: INTERNAL MEDICINE

## 2023-07-05 PROCEDURE — 83970 ASSAY OF PARATHORMONE: CPT | Performed by: PATHOLOGY

## 2023-07-05 PROCEDURE — 83540 ASSAY OF IRON: CPT | Performed by: PATHOLOGY

## 2023-07-05 PROCEDURE — 99000 SPECIMEN HANDLING OFFICE-LAB: CPT | Performed by: PATHOLOGY

## 2023-07-05 PROCEDURE — 86038 ANTINUCLEAR ANTIBODIES: CPT | Performed by: INTERNAL MEDICINE

## 2023-07-05 PROCEDURE — 82306 VITAMIN D 25 HYDROXY: CPT | Performed by: INTERNAL MEDICINE

## 2023-07-05 PROCEDURE — 87340 HEPATITIS B SURFACE AG IA: CPT | Performed by: INTERNAL MEDICINE

## 2023-07-05 PROCEDURE — 86706 HEP B SURFACE ANTIBODY: CPT | Performed by: INTERNAL MEDICINE

## 2023-07-05 PROCEDURE — G0463 HOSPITAL OUTPT CLINIC VISIT: HCPCS | Performed by: INTERNAL MEDICINE

## 2023-07-05 PROCEDURE — 82728 ASSAY OF FERRITIN: CPT | Performed by: PATHOLOGY

## 2023-07-05 PROCEDURE — 36415 COLL VENOUS BLD VENIPUNCTURE: CPT | Performed by: PATHOLOGY

## 2023-07-05 PROCEDURE — 83550 IRON BINDING TEST: CPT | Performed by: PATHOLOGY

## 2023-07-05 RX ORDER — SIMVASTATIN 40 MG
40 TABLET ORAL AT BEDTIME
Qty: 90 TABLET | Refills: 3 | Status: SHIPPED | OUTPATIENT
Start: 2023-07-05 | End: 2023-12-15

## 2023-07-05 RX ORDER — HYDROCHLOROTHIAZIDE 25 MG/1
25 TABLET ORAL DAILY
Qty: 90 TABLET | Refills: 4 | Status: SHIPPED | OUTPATIENT
Start: 2023-07-05 | End: 2023-12-15

## 2023-07-05 RX ORDER — LISINOPRIL 40 MG/1
40 TABLET ORAL DAILY
Qty: 90 TABLET | Refills: 4 | Status: SHIPPED | OUTPATIENT
Start: 2023-07-05 | End: 2023-12-15

## 2023-07-05 ASSESSMENT — PAIN SCALES - GENERAL: PAINLEVEL: NO PAIN (0)

## 2023-07-05 NOTE — NURSING NOTE
Chief Complaint   Patient presents with     Consult     New pt consult.     Blood pressure (!) 207/109, pulse 88, temperature 98.5  F (36.9  C), weight 125.8 kg (277 lb 6.4 oz), SpO2 98 %.    WES JEONG

## 2023-07-05 NOTE — PATIENT INSTRUCTIONS
It was a pleasure taking care of you today.  I've included a brief summary of our discussion and care plan from today's visit below.  Please review this information with your primary care provider.     My recommendations are summarized as follows:  -Your sugars are not well controlled. Please avoid any fast food and try to stick to healthy diet. Canned food has a lot of salt and are definitely not good for your blood pressure.  -We will stop the Zestoretic and change it to lisinopril 40 mg.  You will also take hydrochlorothiazide 25 mg daily.  -We will start a new medication called Jardiance 25 mg daily.  It is good for the diabetes, kidneys, heart, and helps with weight loss.  -We will refer you to the weight loss management program.  It is very essential to lose weight because it will help your blood pressure, diabetes and your kidneys.  -There is a complete new set of blood test that we are going to do.  I would like to see you in 6-week and then we can further decide on proceeding or not with a kidney biopsy.  -We also will do a kidney ultrasound  -Continue to measure your blood pressure at home and bring your book to clinic next time    Who do I call with any questions after my visit?  Please be in touch if there are any further questions that arise following today's visit.  There are multiple ways to contact your nephrology care team.       During business hours, you may reach your Nephrology Care Team or schedule or reschedule an appointment or lab at [phone].       If you need to schedule imaging, please call [phone].   To schedule a COVID test, please call [phone].     You can always send a secure message through [employer]. [employer] messages are answered by your nurse or doctor typically within 24-48 hours. Please allow extra time on weekends and holidays.       For urgent/emergent questions after business hours, you may reach the on-call Nephrology Fellow by contacting the [facility]   at [phone].     How will I get the results of any tests ordered?    You will receive all of your results.  If you have signed up for [employer], any tests ordered at your visit will be available to you once resulted on MyChart. Typically the physician reviews them and may or may not make further recommendations. If there are urgent results that require a change in your care plan, your physician or nurse will call you to discuss the next steps. If you are not on MyChart, a letter may be generated and mailed to you with your results.

## 2023-07-05 NOTE — PROGRESS NOTES
I was asked to see this patient by Sean Soriano    CC:nephrotic range proteinuria    HPI:   Thank you for the referral. I had the pleasure today of seeing Davie Banuelos. He is a 52 year old male  who presents for evaluation of nephrotic range proteinuria.  He is here by himself.  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    He has a longstanding history of diabetes mellitus since 2007.  His diabetes is poorly controlled his hemoglobin A1c has been ranging between 10 and 11% over the past several years.  His diabetes is complicated by early diabetic retinopathy.  He is currently on insulin.    He also has longstanding hypertension.  He notes that he had high blood pressure since the age of 17 but he only started on medications at the age of 40.  That is 12 years ago.  His blood pressure is not at goal and his blood pressure readings at home has been around 1 50-1 60 systolic.  He is also obese and has been so for several years now.  He admits to having very poor eating habits which she attributes to him being on the road most of the time.  He also notes that he has coronary artery disease but had no PTCA or any other interventions done.    He is here today for nephrotic range proteinuria.  He notes some lower extremity edema.  He denies any gross hematuria or any other urinary symptoms.  He denies any shortness of breath or chest pain.  He denies any weight loss, fevers or chills.  He also denies any skin rash, oral ulcers, genital ulcers, back pain, abdominal pain or joint pain.  He has no acute complaints during this visit.    Wt Readings from Last 4 Encounters:   07/05/23 125.8 kg (277 lb 6.4 oz)   05/22/23 128 kg (282 lb 4 oz)   08/09/22 123.1 kg (271 lb 6 oz)   02/15/21 124.5 kg (274 lb 8 oz)     Social: He is single with no children he has a girlfriend for 30 years he works in a company that is specialized in [employer] and [employer].    No Known Allergies    ACCU-CHEK SOFTCLIX LANCETS, Test 2 times  "daily  alcohol swab prep pads, Use to swab area of injection/adore as directed.  amLODIPine (NORVASC) 10 MG tablet, Take 1 tablet (10 mg) by mouth daily  aspirin 81 MG tablet, Take 1 tablet by mouth daily.  blood glucose (NO BRAND SPECIFIED) test strip, Use to test blood sugar 2 times daily or as directed. To accompany: Blood Glucose Monitor Brands: per insurance.  blood glucose calibration (NO BRAND SPECIFIED) solution, To accompany: Blood Glucose Monitor Brands: per insurance.  blood glucose monitoring (ACCU-CHEK SWETA) test strip, Test twice daily (labile sugars)  blood glucose monitoring (NO BRAND SPECIFIED) meter device kit, Use to test blood sugar 2 times daily or as directed. Preferred blood glucose meter OR supplies to accompany: Blood Glucose Monitor Brands: per insurance.  carvedilol (COREG) 25 MG tablet, Take 1 tablet (25 mg) by mouth 2 times daily (with meals)  fluticasone (FLONASE) 50 MCG/ACT nasal spray, Spray 2 sprays into both nostrils daily  glipiZIDE (GLUCOTROL) 10 MG tablet, TAKE 1 TABLET BY MOUTH TWICE DAILY BEFORE A MEAL  insulin pen needle (32G X 6 MM) 32G X 6 MM miscellaneous, Use 1 pen needles daily or as directed.  Insulin Pen Needle (PEN NEEDLES 5/16\") 31G X 8 MM MISC, 1 each daily  Krill Oil CAPS, Take  by mouth daily.  liraglutide (VICTOZA) 18 MG/3ML solution, Inject 1.8 mg Subcutaneous daily Or other covered GLP med per pharmacist discretion  lisinopril-hydrochlorothiazide (ZESTORETIC) 20-25 MG tablet, Take 2 tablets by mouth daily  metFORMIN (GLUCOPHAGE) 1000 MG tablet, TAKE 1 TABLET BY MOUTH TWICE DAILY  simvastatin (ZOCOR) 20 MG tablet, Take 1 tablet (20 mg) by mouth At Bedtime  thin (NO BRAND SPECIFIED) lancets, Use with lanceting device. To accompany: Blood Glucose Monitor Brands: per insurance.    No current facility-administered medications on file prior to visit.      Past Medical History:   Diagnosis Date     CAD (coronary artery disease) about 2000    mild mi, no stent needed  "     DM (diabetes mellitus), type 2 (H)      High cholesterol      HTN (hypertension)      Obesity        No past surgical history on file.    Social History     Tobacco Use     Smoking status: Never     Smokeless tobacco: Never   Vaping Use     Vaping Use: Never used   Substance Use Topics     Alcohol use: No     Drug use: No       Family History   Problem Relation Age of Onset     C.A.D. Mother 50        stents, cabg, pacemaker     Cancer Father         lung, smoker     Diabetes Maternal Grandmother      Coronary Artery Disease Sister         stents     Hypertension Sister         age 54 ( half sister )     Diabetes Sister      Coronary Artery Disease Brother 64        stents       ROS: A 12 system review of systems was negative other than noted here or above.     Exam:  BP (!) 207/109   Pulse 88   Temp 98.5  F (36.9  C)   Wt 125.8 kg (277 lb 6.4 oz)   SpO2 98%   BMI 39.80 kg/m     157/72  BP Readings from Last 6 Encounters:   07/05/23 (!) 207/109   05/22/23 (!) 174/96   08/09/22 (!) 180/95   02/15/21 (!) 146/85   01/13/20 (!) 172/100   08/13/18 165/90       GENERAL APPEARANCE: alert and no distress  EYES: PERRL  HENT: mouth without ulcers or lesions  NECK: supple, no adenopathy  RESP: lungs clear to auscultation - no rales, rhonchi or wheezes  CV: regular rhythm, normal rate, no rub   ABDOMEN:  soft, nontender, no HSM or masses and bowel sounds normal  MS: extremities normal- no gross deformities noted, no evidence of inflammation in joints, no muscle tenderness  SKIN: no rash  NEURO: Normal strength and tone, sensory exam grossly normal, mentation intact and speech normal  PSYCH: mentation appears normal. and affect normal/bright  +1 lower extremity edema    Results: Reviewed in details with the patient  Assessment/Plan:  Problem #1 nephrotic range proteinuria: His proteinuria dates back to 2016 and it has been progressively increasing it peaked and August 2022 with 7 g and it is currently at 6 g/g of  creatinine.  His proteinuria is mostly albumin.  This is associated with some hematuria and an increase in his creatinine from 0.8 mg/dL in 2017 up to 0.9mg/dL in 2022 and his most recent creatinine today is at 1.37 mg/dL.  Given his poorly controlled diabetes and hypertension, this is probably secondary to diabetic nephropathy.  However given his hematuria, other causes of glomerular diseases should be ruled out as well.  - We will work him up for a membranous nephropathy, viral hepatitis serology and HIV, monoclonal gammopathy, and other GNs.  - Today we emphasized the extreme importance of controlling his blood pressure as well as his diabetes to halt the progression of his chronic kidney disease.  His blood pressure has been uncontrolled for a long time as well as his hemoglobin A1c.  We discussed today the importance of a healthy diet and losing weight.  - He is already on RAAS blockade  - We will start him on Jardiance 25 mg daily  - We will repeat his labs in 6-week and see him in the clinic.  If there is no improvement or there is worsening of his proteinuria or creatinine, then we will rediscuss the need for a kidney biopsy.  We have touched based on that briefly during this visit.    Problem #2 uncontrolled essential hypertension: His blood pressure in clinic today is extremely elevated. His blood pressure at home has been running around  150-160 systolic and it has been this way for a long time.  Re-emphasized the importance of blood pressure control in halting the progression of chronic kidney disease.  His goal is 120/80.  - Given his hypokalemia, will check aldosterone to renin ratio.  - Given his metabolic syndrome, we will check for renal artery stenosis with renal duplex.  -We stopped his lisinopril hydrochlorothiazide 20-25 mg.  Will increase his lisinopril to 40 mg daily and will keep his hydrochlorothiazide at 25 mg.  -We also added Jardiance 25 mg daily which also will help with blood pressure  given its natruretic effect.  -I expect that he will need more medications to control his blood pressure and our options can be either a spironolactone or a loop diuretic in the setting of lower extremity edema.    - I emphasized the importance of low-salt and better blood pressure control.  He admits to poor eating habits and he consumes a lot of canned food.    Problem #3 poorly controlled diabetes mellitus type 2: His hemoglobin A1c over the past few years has been around 10 to 11%.  - Again emphasized the importance of a low-carb diet  - Added Jardiance 25 mg daily    Problem #4 obesity:  - We will refer him to the weight management program.  He will need some dietary advice as well.  - He is at high risk for obstructive sleep apnea and we will consider doing a sleep study.  We will discuss that during his next visit.      *This dictation was prepared in part using Dragon recognition software.  As a result errors may occur. When identified these transcription errors have been corrected.  While every attempt is made to correct errors during dictation, errors may still exist.     Ray Jesus MD   [facility]   Department of Medicine   Division of Renal Disease and Hypertension

## 2023-07-05 NOTE — LETTER
7/5/2023       RE: Davie Banuelos  [address] [address] [address]     Dear Colleague,    Thank you for referring your patient, Davie Banuelos, to the [facility] NEPHROLOGY CLINIC [place] at [facility]. Please see a copy of my visit note below.    I was asked to see this patient by Sean Soriano    CC:nephrotic range proteinuria    HPI:   Thank you for the referral. I had the pleasure today of seeing Davie Banuelos. He is a 52 year old male  who presents for evaluation of nephrotic range proteinuria.  He is here by himself.  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    He has a longstanding history of diabetes mellitus since 2007.  His diabetes is poorly controlled his hemoglobin A1c has been ranging between 10 and 11% over the past several years.  His diabetes is complicated by early diabetic retinopathy.  He is currently on insulin.    He also has longstanding hypertension.  He notes that he had high blood pressure since the age of 17 but he only started on medications at the age of 40.  That is 12 years ago.  His blood pressure is not at goal and his blood pressure readings at home has been around 1 50-1 60 systolic.  He is also obese and has been so for several years now.  He admits to having very poor eating habits which she attributes to him being on the road most of the time.  He also notes that he has coronary artery disease but had no PTCA or any other interventions done.    He is here today for nephrotic range proteinuria.  He notes some lower extremity edema.  He denies any gross hematuria or any other urinary symptoms.  He denies any shortness of breath or chest pain.  He denies any weight loss, fevers or chills.  He also denies any skin rash, oral ulcers, genital ulcers, back pain, abdominal pain or joint pain.  He has no acute complaints during this visit.    Wt Readings from Last 4 Encounters:   07/05/23 125.8 kg (277 lb 6.4 oz)  "  05/22/23 128 kg (282 lb 4 oz)   08/09/22 123.1 kg (271 lb 6 oz)   02/15/21 124.5 kg (274 lb 8 oz)     Social: He is single with no children he has a girlfriend for 30 years he works in a company that is specialized in [employer] and [employer]ing.    No Known Allergies    ACCU-CHEK SOFTCLIX LANCETS, Test 2 times daily  alcohol swab prep pads, Use to swab area of injection/adore as directed.  amLODIPine (NORVASC) 10 MG tablet, Take 1 tablet (10 mg) by mouth daily  aspirin 81 MG tablet, Take 1 tablet by mouth daily.  blood glucose (NO BRAND SPECIFIED) test strip, Use to test blood sugar 2 times daily or as directed. To accompany: Blood Glucose Monitor Brands: per insurance.  blood glucose calibration (NO BRAND SPECIFIED) solution, To accompany: Blood Glucose Monitor Brands: per insurance.  blood glucose monitoring (ACCU-CHEK SWETA) test strip, Test twice daily (labile sugars)  blood glucose monitoring (NO BRAND SPECIFIED) meter device kit, Use to test blood sugar 2 times daily or as directed. Preferred blood glucose meter OR supplies to accompany: Blood Glucose Monitor Brands: per insurance.  carvedilol (COREG) 25 MG tablet, Take 1 tablet (25 mg) by mouth 2 times daily (with meals)  fluticasone (FLONASE) 50 MCG/ACT nasal spray, Spray 2 sprays into both nostrils daily  glipiZIDE (GLUCOTROL) 10 MG tablet, TAKE 1 TABLET BY MOUTH TWICE DAILY BEFORE A MEAL  insulin pen needle (32G X 6 MM) 32G X 6 MM miscellaneous, Use 1 pen needles daily or as directed.  Insulin Pen Needle (PEN NEEDLES 5/16\") 31G X 8 MM MISC, 1 each daily  Krill Oil CAPS, Take  by mouth daily.  liraglutide (VICTOZA) 18 MG/3ML solution, Inject 1.8 mg Subcutaneous daily Or other covered GLP med per pharmacist discretion  lisinopril-hydrochlorothiazide (ZESTORETIC) 20-25 MG tablet, Take 2 tablets by mouth daily  metFORMIN (GLUCOPHAGE) 1000 MG tablet, TAKE 1 TABLET BY MOUTH TWICE DAILY  simvastatin (ZOCOR) 20 MG tablet, Take 1 tablet (20 mg) by mouth At " Bedtime  thin (NO BRAND SPECIFIED) lancets, Use with lanceting device. To accompany: Blood Glucose Monitor Brands: per insurance.    No current facility-administered medications on file prior to visit.      Past Medical History:   Diagnosis Date    CAD (coronary artery disease) about 2000    mild mi, no stent needed     DM (diabetes mellitus), type 2 (H)     High cholesterol     HTN (hypertension)     Obesity        No past surgical history on file.    Social History     Tobacco Use    Smoking status: Never    Smokeless tobacco: Never   Vaping Use    Vaping Use: Never used   Substance Use Topics    Alcohol use: No    Drug use: No       Family History   Problem Relation Age of Onset    C.A.D. Mother 50        stents, cabg, pacemaker    Cancer Father         lung, smoker    Diabetes Maternal Grandmother     Coronary Artery Disease Sister         stents    Hypertension Sister         age 54 ( half sister )    Diabetes Sister     Coronary Artery Disease Brother 64        stents       ROS: A 12 system review of systems was negative other than noted here or above.     Exam:  BP (!) 207/109   Pulse 88   Temp 98.5  F (36.9  C)   Wt 125.8 kg (277 lb 6.4 oz)   SpO2 98%   BMI 39.80 kg/m     157/72  BP Readings from Last 6 Encounters:   07/05/23 (!) 207/109   05/22/23 (!) 174/96   08/09/22 (!) 180/95   02/15/21 (!) 146/85   01/13/20 (!) 172/100   08/13/18 165/90       GENERAL APPEARANCE: alert and no distress  EYES: PERRL  HENT: mouth without ulcers or lesions  NECK: supple, no adenopathy  RESP: lungs clear to auscultation - no rales, rhonchi or wheezes  CV: regular rhythm, normal rate, no rub   ABDOMEN:  soft, nontender, no HSM or masses and bowel sounds normal  MS: extremities normal- no gross deformities noted, no evidence of inflammation in joints, no muscle tenderness  SKIN: no rash  NEURO: Normal strength and tone, sensory exam grossly normal, mentation intact and speech normal  PSYCH: mentation appears normal. and  affect normal/bright  +1 lower extremity edema    Results: Reviewed in details with the patient  Assessment/Plan:  Problem #1 nephrotic range proteinuria: His proteinuria dates back to 2016 and it has been progressively increasing it peaked and August 2022 with 7 g and it is currently at 6 g/g of creatinine.  His proteinuria is mostly albumin.  This is associated with some hematuria and an increase in his creatinine from 0.8 mg/dL in 2017 up to 0.9mg/dL in 2022 and his most recent creatinine today is at 1.37 mg/dL.  Given his poorly controlled diabetes and hypertension, this is probably secondary to diabetic nephropathy.  However given his hematuria, other causes of glomerular diseases should be ruled out as well.  - We will work him up for a membranous nephropathy, viral hepatitis serology and HIV, monoclonal gammopathy, and other GNs.  - Today we emphasized the extreme importance of controlling his blood pressure as well as his diabetes to halt the progression of his chronic kidney disease.  His blood pressure has been uncontrolled for a long time as well as his hemoglobin A1c.  We discussed today the importance of a healthy diet and losing weight.  - He is already on RAAS blockade  - We will start him on Jardiance 25 mg daily  - We will repeat his labs in 6-week and see him in the clinic.  If there is no improvement or there is worsening of his proteinuria or creatinine, then we will rediscuss the need for a kidney biopsy.  We have touched based on that briefly during this visit.    Problem #2 uncontrolled essential hypertension: His blood pressure in clinic today is extremely elevated. His blood pressure at home has been running around  150-160 systolic and it has been this way for a long time.  Re-emphasized the importance of blood pressure control in halting the progression of chronic kidney disease.  His goal is 120/80.  - Given his hypokalemia, will check aldosterone to renin ratio.  - Given his metabolic  syndrome, we will check for renal artery stenosis with renal duplex.  -We stopped his lisinopril hydrochlorothiazide 20-25 mg.  Will increase his lisinopril to 40 mg daily and will keep his hydrochlorothiazide at 25 mg.  -We also added Jardiance 25 mg daily which also will help with blood pressure given its natruretic effect.  -I expect that he will need more medications to control his blood pressure and our options can be either a spironolactone or a loop diuretic in the setting of lower extremity edema.    - I emphasized the importance of low-salt and better blood pressure control.  He admits to poor eating habits and he consumes a lot of canned food.    Problem #3 poorly controlled diabetes mellitus type 2: His hemoglobin A1c over the past few years has been around 10 to 11%.  - Again emphasized the importance of a low-carb diet  - Added Jardiance 25 mg daily    Problem #4 obesity:  - We will refer him to the weight management program.  He will need some dietary advice as well.  - He is at high risk for obstructive sleep apnea and we will consider doing a sleep study.  We will discuss that during his next visit.      *This dictation was prepared in part using Dragon recognition software.  As a result errors may occur. When identified these transcription errors have been corrected.  While every attempt is made to correct errors during dictation, errors may still exist.     Ray Jesus MD   [facility]   Department of Medicine   Division of Renal Disease and Hypertension

## 2023-07-06 LAB
ANA PAT SER IF-IMP: ABNORMAL
ANA SER QL IF: ABNORMAL
ANA TITR SER IF: ABNORMAL {TITER}
ANCA AB PATTERN SER IF-IMP: NORMAL
C-ANCA TITR SER IF: NORMAL {TITER}
C3 SERPL-MCNC: 144 MG/DL (ref 81–157)
C4 SERPL-MCNC: 23 MG/DL (ref 13–39)
DEPRECATED CALCIDIOL+CALCIFEROL SERPL-MC: 16 UG/L (ref 20–75)
DSDNA AB SER-ACNC: 0.9 IU/ML
HBV SURFACE AB SERPL IA-ACNC: 0.5 M[IU]/ML
HBV SURFACE AB SERPL IA-ACNC: NONREACTIVE M[IU]/ML
HBV SURFACE AG SERPL QL IA: NONREACTIVE
HCV AB SERPL QL IA: NONREACTIVE
HIV 1+2 AB+HIV1 P24 AG SERPL QL IA: NONREACTIVE

## 2023-07-12 DIAGNOSIS — R80.1 PERSISTENT PROTEINURIA: Primary | ICD-10-CM

## 2023-07-12 LAB
MYELOPEROXIDASE AB SER IA-ACNC: 0.4 U/ML
MYELOPEROXIDASE AB SER IA-ACNC: NEGATIVE
PROTEINASE3 AB SER IA-ACNC: <1 U/ML
PROTEINASE3 AB SER IA-ACNC: NEGATIVE

## 2023-07-13 ENCOUNTER — TELEPHONE (OUTPATIENT)
Dept: NEPHROLOGY | Facility: CLINIC | Age: 53
End: 2023-07-13
Payer: COMMERCIAL

## 2023-07-13 NOTE — TELEPHONE ENCOUNTER
Attempted to schedule 6 week follow up from 7/5 visit per provider, unable to reach pt at this time lvm with writer's direct line for call back.    Thao Michaels,  Nephrology Clinic Navigator  [facility]  Direct: [phone].        Appt scheduled for 8/30 at 3:00 in person with labs at 2:00. Pt's Sig.Other in agreement, lab orders needed.

## 2023-07-30 DIAGNOSIS — I10 ESSENTIAL HYPERTENSION WITH GOAL BLOOD PRESSURE LESS THAN 140/90: ICD-10-CM

## 2023-07-31 RX ORDER — LISINOPRIL AND HYDROCHLOROTHIAZIDE 20; 25 MG/1; MG/1
2 TABLET ORAL DAILY
Qty: 180 TABLET | Refills: 0 | OUTPATIENT
Start: 2023-07-31

## 2023-08-24 ENCOUNTER — TELEPHONE (OUTPATIENT)
Dept: FAMILY MEDICINE | Facility: CLINIC | Age: 53
End: 2023-08-24
Payer: COMMERCIAL

## 2023-08-24 NOTE — LETTER
August 24, 2023    To  Davie Banuelos  [address]   [place] [address]    Your team at [facility] cares about your health. We have reviewed your chart and based on our findings; we are making the following recommendations to better manage your health.     You are in particular need of attention regarding the following:     Schedule a DIABETIC FOLLOW UP appointment for Office Visit. Patients with diabetes should see their provider regularly.  Schedule a DIABETIC EYE EXAM.  This exam is done with an optometrist, annually. You can schedule this appointment with your eye doctor.  If you need a referral, please let us know.  Call or MyChart message your clinic to schedule a colonoscopy, schedule/ a FIT Test, or order a Cologuard test. If you are unsure what type of test you need, please call your clinic and speak to clinic staff.   Colon cancer is now the second leading cause of cancer-related deaths in the [place] for both men and women and there are over 130,000 new cases and 50,000 deaths per year from colon cancer. Colonoscopies can prevent 90-95% of these deaths. Problem lesions can be removed before they ever become cancer. This test is not only looking for cancer, but also getting rid of precancerous lesions.   If you are under/uninsured, we recommend you contact the DCWaferss Program.DCWaferss is a free colorectal cancer screening program that provides colonoscopies for eligible under/uninsured [place] men and women. If you are interested in receiving a free colonoscopy, please call [facility] at t [phone] (mention code [employer]) to see if you're eligible. Please have them send us the results.   Contact your clinic to schedule/ your FIT Test.     If you have already completed these items, please contact the clinic via phone or   [facility] so your care team can review and update your records. Thank you for   choosing [facility] for your  healthcare needs. For any questions,   concerns, or to schedule an appointment please contact our clinic.    Healthy Regards,      Your [facility] Care Team

## 2023-08-24 NOTE — TELEPHONE ENCOUNTER
Patient Quality Outreach    Patient is due for the following:   Diabetes -  A1C, Eye Exam, and Foot Exam  Colon Cancer Screening    Next Steps:   Schedule a office visit for diabetic recheck    Type of outreach:    Sent letter.    Next Steps:  Reach out within 90 days via Phone.    Max number of attempts reached: No. Will try again in 90 days if patient still on fail list.    Questions for provider review:    None           Marisela Milligan, [facility]  Chart routed to Care Team.

## 2023-08-28 DIAGNOSIS — E11.319 TYPE 2 DIABETES MELLITUS WITH RETINOPATHY, WITHOUT LONG-TERM CURRENT USE OF INSULIN, MACULAR EDEMA PRESENCE UNSPECIFIED, UNSPECIFIED LATERALITY, UNSPECIFIED RETINOPATHY SEVERITY (H): ICD-10-CM

## 2023-08-28 RX ORDER — PEN NEEDLE, DIABETIC 32 GX 1/4"
1 NEEDLE, DISPOSABLE MISCELLANEOUS DAILY
Qty: 100 EACH | Refills: 0 | Status: SHIPPED | OUTPATIENT
Start: 2023-08-28 | End: 2024-05-24

## 2023-08-30 ENCOUNTER — LAB (OUTPATIENT)
Dept: LAB | Facility: CLINIC | Age: 53
End: 2023-08-30
Payer: COMMERCIAL

## 2023-08-30 ENCOUNTER — OFFICE VISIT (OUTPATIENT)
Dept: NEPHROLOGY | Facility: CLINIC | Age: 53
End: 2023-08-30
Attending: INTERNAL MEDICINE
Payer: COMMERCIAL

## 2023-08-30 VITALS
WEIGHT: 261.8 LBS | DIASTOLIC BLOOD PRESSURE: 113 MMHG | HEART RATE: 88 BPM | BODY MASS INDEX: 36.65 KG/M2 | HEIGHT: 71 IN | SYSTOLIC BLOOD PRESSURE: 194 MMHG

## 2023-08-30 DIAGNOSIS — R80.1 PERSISTENT PROTEINURIA: ICD-10-CM

## 2023-08-30 DIAGNOSIS — N17.9 AKI (ACUTE KIDNEY INJURY) (H): ICD-10-CM

## 2023-08-30 DIAGNOSIS — N17.9 AKI (ACUTE KIDNEY INJURY) (H): Primary | ICD-10-CM

## 2023-08-30 DIAGNOSIS — E11.8 DM (DIABETES MELLITUS) WITH COMPLICATIONS (H): ICD-10-CM

## 2023-08-30 DIAGNOSIS — I12.9 HYPERTENSION, RENAL: ICD-10-CM

## 2023-08-30 DIAGNOSIS — R80.9 PROTEINURIA, UNSPECIFIED TYPE: ICD-10-CM

## 2023-08-30 DIAGNOSIS — I10 HYPERTENSION, ESSENTIAL: ICD-10-CM

## 2023-08-30 LAB
ALBUMIN MFR UR ELPH: 360 MG/DL
ALBUMIN UR-MCNC: 300 MG/DL
ANION GAP SERPL CALCULATED.3IONS-SCNC: 14 MMOL/L (ref 7–15)
APPEARANCE UR: CLEAR
BILIRUB UR QL STRIP: NEGATIVE
BUN SERPL-MCNC: 26.2 MG/DL (ref 6–20)
CALCIUM SERPL-MCNC: 9.3 MG/DL (ref 8.6–10)
CHLORIDE SERPL-SCNC: 100 MMOL/L (ref 98–107)
COLOR UR AUTO: ABNORMAL
CREAT SERPL-MCNC: 1.47 MG/DL (ref 0.67–1.17)
CREAT UR-MCNC: 67.8 MG/DL
DEPRECATED HCO3 PLAS-SCNC: 25 MMOL/L (ref 22–29)
GFR SERPL CREATININE-BSD FRML MDRD: 56 ML/MIN/1.73M2
GLUCOSE SERPL-MCNC: 146 MG/DL (ref 70–99)
GLUCOSE UR STRIP-MCNC: >=1000 MG/DL
HGB UR QL STRIP: ABNORMAL
KETONES UR STRIP-MCNC: NEGATIVE MG/DL
LEUKOCYTE ESTERASE UR QL STRIP: NEGATIVE
Lab: NORMAL
MUCOUS THREADS #/AREA URNS LPF: PRESENT /LPF
NITRATE UR QL: NEGATIVE
PERFORMING LABORATORY: NORMAL
PH UR STRIP: 6 [PH] (ref 5–7)
POTASSIUM SERPL-SCNC: 3.9 MMOL/L (ref 3.4–5.3)
PROT/CREAT 24H UR: 5.31 MG/MG CR (ref 0–0.2)
RBC URINE: 9 /HPF
SODIUM SERPL-SCNC: 139 MMOL/L (ref 136–145)
SP GR UR STRIP: 1.02 (ref 1–1.03)
SPECIMEN STATUS: NORMAL
TEST NAME: NORMAL
UROBILINOGEN UR STRIP-MCNC: NORMAL MG/DL
WBC URINE: 1 /HPF

## 2023-08-30 PROCEDURE — 81001 URINALYSIS AUTO W/SCOPE: CPT | Performed by: INTERNAL MEDICINE

## 2023-08-30 PROCEDURE — 36415 COLL VENOUS BLD VENIPUNCTURE: CPT | Performed by: PATHOLOGY

## 2023-08-30 PROCEDURE — 84244 ASSAY OF RENIN: CPT | Mod: 90 | Performed by: PATHOLOGY

## 2023-08-30 PROCEDURE — 84165 PROTEIN E-PHORESIS SERUM: CPT | Mod: TC | Performed by: PATHOLOGY

## 2023-08-30 PROCEDURE — 86334 IMMUNOFIX E-PHORESIS SERUM: CPT | Performed by: PATHOLOGY

## 2023-08-30 PROCEDURE — 82088 ASSAY OF ALDOSTERONE: CPT | Performed by: INTERNAL MEDICINE

## 2023-08-30 PROCEDURE — 83520 IMMUNOASSAY QUANT NOS NONAB: CPT | Performed by: PATHOLOGY

## 2023-08-30 PROCEDURE — 99214 OFFICE O/P EST MOD 30 MIN: CPT | Performed by: INTERNAL MEDICINE

## 2023-08-30 PROCEDURE — 84155 ASSAY OF PROTEIN SERUM: CPT | Performed by: INTERNAL MEDICINE

## 2023-08-30 PROCEDURE — 84156 ASSAY OF PROTEIN URINE: CPT | Performed by: INTERNAL MEDICINE

## 2023-08-30 PROCEDURE — 80048 BASIC METABOLIC PNL TOTAL CA: CPT | Performed by: PATHOLOGY

## 2023-08-30 PROCEDURE — 99000 SPECIMEN HANDLING OFFICE-LAB: CPT | Performed by: PATHOLOGY

## 2023-08-30 PROCEDURE — G0463 HOSPITAL OUTPT CLINIC VISIT: HCPCS | Performed by: INTERNAL MEDICINE

## 2023-08-30 PROCEDURE — 84165 PROTEIN E-PHORESIS SERUM: CPT | Mod: 26

## 2023-08-30 PROCEDURE — 83521 IG LIGHT CHAINS FREE EACH: CPT | Performed by: PATHOLOGY

## 2023-08-30 PROCEDURE — 86334 IMMUNOFIX E-PHORESIS SERUM: CPT | Mod: 26

## 2023-08-30 RX ORDER — SPIRONOLACTONE 25 MG/1
12.5 TABLET ORAL DAILY
Qty: 45 TABLET | Refills: 3 | Status: SHIPPED | OUTPATIENT
Start: 2023-08-30 | End: 2023-12-15

## 2023-08-30 ASSESSMENT — PAIN SCALES - GENERAL: PAINLEVEL: NO PAIN (0)

## 2023-08-30 NOTE — LETTER
8/30/2023       RE: Davie Banuelos  [address] [address] [address]     Dear Colleague,    Thank you for referring your patient, Davie Banuelos, to the [facility] NEPHROLOGY CLINIC [place] at [facility]. Please see a copy of my visit note below.    I was asked to see this patient by Sean Soriano    CC:nephrotic range proteinuria    HPI:   I had the pleasure today of seeing Davie Banuelos. He is a 52 year old male  who presents for Follow-up of nephrotic range proteinuria.  He is here by himself.  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    He has a longstanding history of diabetes mellitus since 2007.  His diabetes is poorly controlled; his hemoglobin A1c has been ranging between 10 and 11% over the past several years.  His diabetes is complicated by early diabetic retinopathy.  He is currently on insulin.    He also has longstanding hypertension.  He notes that he had high blood pressure since the age of 17 but he only started on medications at the age of 40.    He is also obese and has been so for several years now.  He admits to having very poor eating habits which he attributes to him being on the road most of the time because of his work.  He also notes that he has coronary artery disease but had no PTCA or any other interventions done.    He is here today for  follow-up of nephrotic range proteinuria.  Since his last visit, he has made significant changes to his dietary habits. He cut down completely on canned food. He is walking regularly recently. He has lost 10 pounds so far. His BP is better 120-150 systolic; one time, it was 180. He notes some lower extremity edema.  He denies any gross hematuria or any other urinary symptoms.  He denies any shortness of breath or chest pain.  He denies any weight loss, fevers or chills.  He also denies any skin rash, oral ulcers, genital ulcers, back pain, abdominal pain or joint pain.  He has no  "acute complaints during this visit.    Wt Readings from Last 4 Encounters:   08/30/23 118.8 kg (261 lb 12.8 oz)   07/05/23 125.8 kg (277 lb 6.4 oz)   05/22/23 128 kg (282 lb 4 oz)   08/09/22 123.1 kg (271 lb 6 oz)     Social: He is single with no children he has a girlfriend for 30 years he works in a company that is specialized in [employer] and [employer].    No Known Allergies    ACCU-CHEK SOFTCLIX LANCETS, Test 2 times daily  alcohol swab prep pads, Use to swab area of injection/adore as directed.  amLODIPine (NORVASC) 10 MG tablet, Take 1 tablet (10 mg) by mouth daily  aspirin 81 MG tablet, Take 1 tablet by mouth daily.  blood glucose (NO BRAND SPECIFIED) test strip, Use to test blood sugar 2 times daily or as directed. To accompany: Blood Glucose Monitor Brands: per insurance.  blood glucose calibration (NO BRAND SPECIFIED) solution, To accompany: Blood Glucose Monitor Brands: per insurance.  blood glucose monitoring (ACCU-CHEK SWETA) test strip, Test twice daily (labile sugars)  blood glucose monitoring (NO BRAND SPECIFIED) meter device kit, Use to test blood sugar 2 times daily or as directed. Preferred blood glucose meter OR supplies to accompany: Blood Glucose Monitor Brands: per insurance.  carvedilol (COREG) 25 MG tablet, Take 1 tablet (25 mg) by mouth 2 times daily (with meals)  empagliflozin (JARDIANCE) 25 MG TABS tablet, Take 1 tablet (25 mg) by mouth daily  fluticasone (FLONASE) 50 MCG/ACT nasal spray, Spray 2 sprays into both nostrils daily  glipiZIDE (GLUCOTROL) 10 MG tablet, TAKE 1 TABLET BY MOUTH TWICE DAILY BEFORE A MEAL  hydrochlorothiazide (HYDRODIURIL) 25 MG tablet, Take 1 tablet (25 mg) by mouth daily  insulin pen needle (BD PEN NEEDLE MICRO U/F) 32G X 6 MM miscellaneous, USE 1 PEN NEEDLE DAILY  Insulin Pen Needle (PEN NEEDLES 5/16\") 31G X 8 MM MISC, 1 each daily  Krill Oil CAPS, Take  by mouth daily.  liraglutide (VICTOZA) 18 MG/3ML solution, Inject 1.8 mg Subcutaneous daily Or " "other covered GLP med per pharmacist discretion  lisinopril (ZESTRIL) 40 MG tablet, Take 1 tablet (40 mg) by mouth daily  metFORMIN (GLUCOPHAGE) 1000 MG tablet, TAKE 1 TABLET BY MOUTH TWICE DAILY  simvastatin (ZOCOR) 40 MG tablet, Take 1 tablet (40 mg) by mouth At Bedtime  thin (NO BRAND SPECIFIED) lancets, Use with lanceting device. To accompany: Blood Glucose Monitor Brands: per insurance.    No current facility-administered medications on file prior to visit.      Past Medical History:   Diagnosis Date     CAD (coronary artery disease) about 2000    mild mi, no stent needed      DM (diabetes mellitus), type 2 (H)      High cholesterol      HTN (hypertension)      Obesity        No past surgical history on file.    Social History     Tobacco Use     Smoking status: Never     Smokeless tobacco: Never   Vaping Use     Vaping Use: Never used   Substance Use Topics     Alcohol use: No     Drug use: No       Family History   Problem Relation Age of Onset     C.A.D. Mother 50        stents, cabg, pacemaker     Cancer Father         lung, smoker     Diabetes Maternal Grandmother      Coronary Artery Disease Sister         stents     Hypertension Sister         age 54 ( half sister )     Diabetes Sister      Coronary Artery Disease Brother 64        stents       ROS: A 12 system review of systems was negative other than noted here or above.     Exam:  BP (!) 194/113   Pulse 88   Ht 1.803 m (5' 11\")   Wt 118.8 kg (261 lb 12.8 oz)   BMI 36.51 kg/m       BP Readings from Last 6 Encounters:   08/30/23 (!) 194/113   07/05/23 (!) 207/109   05/22/23 (!) 174/96   08/09/22 (!) 180/95   02/15/21 (!) 146/85   01/13/20 (!) 172/100       GENERAL APPEARANCE: alert and no distress  EYES: PERRL  HENT: mouth without ulcers or lesions  NECK: supple, no adenopathy  RESP: lungs clear to auscultation - no rales, rhonchi or wheezes  CV: regular rhythm, normal rate, no rub   ABDOMEN:  soft, nontender, no HSM or masses and bowel sounds " normal  MS: extremities normal- no gross deformities noted, no evidence of inflammation in joints, no muscle tenderness  SKIN: no rash  NEURO: Normal strength and tone, sensory exam grossly normal, mentation intact and speech normal  PSYCH: mentation appears normal. and affect normal/bright  +1 lower extremity edema    Results: Reviewed in details with the patient    Assessment/Plan:  Problem #1 nephrotic range proteinuria: His proteinuria dates back to 2016 and it has been progressively increasing. It peaked in August 2022 with 7 g. Initial MALDONADO with viral hepatitis serology and HIV, monoclonal gammopathy was negative. [place] maldonado sent to [facility] and is not out yet. His UPCR is now down to 5.3 g/g from 6.8. His creatinine is slightly elevated today at 1.47mg/dl. He has some hematuria.   Given his poorly controlled diabetes and hypertension, this is probably secondary to diabetic nephropathy.  Holding on a renal biopsy for now given some improvement clinically and in his life style.   We again emphasized the extreme importance of controlling his blood pressure as well as his diabetes to halt the progression of his chronic kidney disease.   - He is already on RAAS blockade  - He is Jardiance 25 mg daily    Problem #2 mild increase in creatinine: this could be related to the recently started Jardiance. Will monitor for now.    Problem #3 uncontrolled essential hypertension: His blood pressure in clinic today is extremely elevated. His blood pressure at home is better and it has been running around  120-150 systolic.   -Re-emphasized the importance of blood pressure control in halting the progression of chronic kidney disease.  His goal is 120/80.  - His aldosterone to renin ratio is slightly elevated, will readdress this is BP is not controlled upon follow up.   - Given his metabolic syndrome, we will check for renal artery stenosis with renal duplex.  - he is on lisinopril 40 mg daily,  hydrochlorothiazide 25 mg,  amlodipine 10 mg daily.  -Jardiance 25 mg daily which also will help with blood pressure given its natruretic effect.  -I expect that he will need more medications to control his blood pressure and our options can be either a spironolactone or a loop diuretic in the setting of lower extremity edema.    - I emphasized the importance of low-salt and better blood pressure control.      Problem #4 poorly controlled diabetes mellitus type 2: His hemoglobin A1c over the past few years has been around 10 to 11%. Glucose much better today.  - Again emphasized the importance of a low-carb diet  - On Jardiance 25 mg daily    Problem #5 obesity:  - He lost significant weight on his own and he plans to continue to do so.  - He is at high risk for obstructive sleep apnea and we will consider doing a sleep study if needed at a later stage.    The total time of this encounter amounted to 38 minutes on the day of the encounter 8/30/2023. This time included face to face time spent with the patient, preparatory work and chart review, ordering tests, and performing post visit documentation.    *This dictation was prepared in part using Dragon recognition software.  As a result errors may occur. When identified these transcription errors have been corrected.  While every attempt is made to correct errors during dictation, errors may still exist.     Isrrael Solomon MD   [facility]   Department of Medicine   Division of Renal Disease and Hypertension                       Again, thank you for allowing me to participate in the care of your patient.      Sincerely,    ISRRAEL SOLOMON MD

## 2023-08-30 NOTE — PROGRESS NOTES
I was asked to see this patient by Sean Soriano    CC:nephrotic range proteinuria    HPI:   I had the pleasure today of seeing Davie Banuelos. He is a 52 year old male  who presents for Follow-up of nephrotic range proteinuria.  He is here by himself.  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    He has a longstanding history of diabetes mellitus since 2007.  His diabetes is poorly controlled; his hemoglobin A1c has been ranging between 10 and 11% over the past several years.  His diabetes is complicated by early diabetic retinopathy.  He is currently on insulin.    He also has longstanding hypertension.  He notes that he had high blood pressure since the age of 17 but he only started on medications at the age of 40.    He is also obese and has been so for several years now.  He admits to having very poor eating habits which he attributes to him being on the road most of the time because of his work.  He also notes that he has coronary artery disease but had no PTCA or any other interventions done.    He is here today for  follow-up of nephrotic range proteinuria.  Since his last visit, he has made significant changes to his dietary habits. He cut down completely on canned food. He is walking regularly recently. He has lost 10 pounds so far. His BP is better 120-150 systolic; one time, it was 180. He notes some lower extremity edema.  He denies any gross hematuria or any other urinary symptoms.  He denies any shortness of breath or chest pain.  He denies any weight loss, fevers or chills.  He also denies any skin rash, oral ulcers, genital ulcers, back pain, abdominal pain or joint pain.  He has no acute complaints during this visit.    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    No Known Allergies    ACCU-CHEK SOFTCLIX LANCETS, Test 2 times daily  alcohol swab prep pads, Use to swab area of injection/adore as directed.  amLODIPine (NORVASC) 10 MG tablet, Take 1 tablet (10 mg) by mouth daily  aspirin 81 MG tablet, Take 1 tablet by mouth daily.  blood glucose (NO BRAND SPECIFIED) test strip, Use to test blood sugar 2 times daily or as directed. To accompany: Blood Glucose Monitor Brands: per insurance.  blood glucose calibration (NO BRAND SPECIFIED) solution, To accompany: Blood Glucose Monitor Brands: per insurance.  blood glucose monitoring (ACCU-CHEK SWETA) test strip, Test twice daily (labile sugars)  blood glucose monitoring (NO BRAND SPECIFIED) meter device kit, Use to test blood sugar 2 times daily or as directed. Preferred blood glucose meter OR supplies to accompany: Blood Glucose Monitor Brands: per insurance.  carvedilol (COREG) 25 MG tablet, Take 1 tablet (25 mg) by mouth 2 times daily (with meals)  empagliflozin (JARDIANCE) 25 MG TABS tablet, Take 1 tablet (25 mg) by mouth daily  fluticasone (FLONASE) 50 MCG/ACT nasal spray, Spray 2 sprays into both nostrils daily  glipiZIDE (GLUCOTROL) 10 MG tablet, TAKE 1 TABLET BY MOUTH TWICE DAILY BEFORE A MEAL  hydrochlorothiazide (HYDRODIURIL) 25 MG tablet, Take 1 tablet (25 mg) by mouth daily  insulin pen needle (BD PEN NEEDLE MICRO U/F) 32G X 6 MM miscellaneous, USE 1 PEN NEEDLE DAILY  Insulin Pen Needle (PEN NEEDLES 5/16\") 31G X 8 MM MISC, 1 each daily  Krill Oil CAPS, Take  by mouth daily.  liraglutide (VICTOZA) 18 MG/3ML solution, Inject 1.8 mg Subcutaneous daily Or other covered GLP med per pharmacist discretion  lisinopril (ZESTRIL) 40 MG tablet, Take 1 tablet (40 mg) by mouth daily  metFORMIN (GLUCOPHAGE) 1000 MG tablet, TAKE 1 TABLET BY MOUTH TWICE DAILY  simvastatin (ZOCOR) 40 MG tablet, Take 1 tablet (40 mg) by mouth At Bedtime  thin (NO BRAND SPECIFIED) lancets, Use with " "lanceting device. To accompany: Blood Glucose Monitor Brands: per insurance.    No current facility-administered medications on file prior to visit.      Past Medical History:   Diagnosis Date    CAD (coronary artery disease) about 2000    mild mi, no stent needed     DM (diabetes mellitus), type 2 (H)     High cholesterol     HTN (hypertension)     Obesity        No past surgical history on file.    Social History     Tobacco Use    Smoking status: Never    Smokeless tobacco: Never   Vaping Use    Vaping Use: Never used   Substance Use Topics    Alcohol use: No    Drug use: No       Family History   Problem Relation Age of Onset    C.A.D. Mother 50        stents, cabg, pacemaker    Cancer Father         lung, smoker    Diabetes Maternal Grandmother     Coronary Artery Disease Sister         stents    Hypertension Sister         age 54 ( half sister )    Diabetes Sister     Coronary Artery Disease Brother 64        stents       ROS: A 12 system review of systems was negative other than noted here or above.     Exam:  BP (!) 194/113   Pulse 88   Ht 1.803 m (5' 11\")   Wt 118.8 kg (261 lb 12.8 oz)   BMI 36.51 kg/m       BP Readings from Last 6 Encounters:   08/30/23 (!) 194/113   07/05/23 (!) 207/109   05/22/23 (!) 174/96   08/09/22 (!) 180/95   02/15/21 (!) 146/85   01/13/20 (!) 172/100       GENERAL APPEARANCE: alert and no distress  EYES: PERRL  HENT: mouth without ulcers or lesions  NECK: supple, no adenopathy  RESP: lungs clear to auscultation - no rales, rhonchi or wheezes  CV: regular rhythm, normal rate, no rub   ABDOMEN:  soft, nontender, no HSM or masses and bowel sounds normal  MS: extremities normal- no gross deformities noted, no evidence of inflammation in joints, no muscle tenderness  SKIN: no rash  NEURO: Normal strength and tone, sensory exam grossly normal, mentation intact and speech normal  PSYCH: mentation appears normal. and affect normal/bright  +1 lower extremity edema    Results: Reviewed " in details with the patient    Assessment/Plan:  Problem #1 nephrotic range proteinuria: His proteinuria dates back to 2016 and it has been progressively increasing. It peaked in August 2022 with 7 g. Initial MALDONADO with viral hepatitis serology and HIV, monoclonal gammopathy was negative. SULEMA maldonado sent to [facility] and is not out yet. His UPCR is now down to 5.3 g/g from 6.8. His creatinine is slightly elevated today at 1.47mg/dl. He has some hematuria.   Given his poorly controlled diabetes and hypertension, this is probably secondary to diabetic nephropathy.  Holding on a renal biopsy for now given some improvement clinically and in his life style.   We again emphasized the extreme importance of controlling his blood pressure as well as his diabetes to halt the progression of his chronic kidney disease.   - He is already on RAAS blockade  - He is Jardiance 25 mg daily    Problem #2 mild increase in creatinine: this could be related to the recently started Jardiance. Will monitor for now.    Problem #3 uncontrolled essential hypertension: His blood pressure in clinic today is extremely elevated. His blood pressure at home is better and it has been running around  120-150 systolic.   -Re-emphasized the importance of blood pressure control in halting the progression of chronic kidney disease.  His goal is 120/80.  - His aldosterone to renin ratio is slightly elevated, will readdress this is BP is not controlled upon follow up.   - Given his metabolic syndrome, we will check for renal artery stenosis with renal duplex.  - he is on lisinopril 40 mg daily,  hydrochlorothiazide 25 mg, amlodipine 10 mg daily.  -Jardiance 25 mg daily which also will help with blood pressure given its natruretic effect.  -I expect that he will need more medications to control his blood pressure and our options can be either a spironolactone or a loop diuretic in the setting of lower extremity edema.    - I emphasized the importance of low-salt  and better blood pressure control.      Problem #4 poorly controlled diabetes mellitus type 2: His hemoglobin A1c over the past few years has been around 10 to 11%. Glucose much better today.  - Again emphasized the importance of a low-carb diet  - On Jardiance 25 mg daily    Problem #5 obesity:  - He lost significant weight on his own and he plans to continue to do so.  - He is at high risk for obstructive sleep apnea and we will consider doing a sleep study if needed at a later stage.    The total time of this encounter amounted to 38 minutes on the day of the encounter 8/30/2023. This time included face to face time spent with the patient, preparatory work and chart review, ordering tests, and performing post visit documentation.    *This dictation was prepared in part using Dragon recognition software.  As a result errors may occur. When identified these transcription errors have been corrected.  While every attempt is made to correct errors during dictation, errors may still exist.     Ray Jesus MD   [facility]   Department of Medicine   Division of Renal Disease and Hypertension

## 2023-08-30 NOTE — PATIENT INSTRUCTIONS
It was a pleasure taking care of you today.  I've included a brief summary of our discussion and care plan from today's visit below.  Please review this information with your primary care provider.     My recommendations are summarized as follows:  -Great Job so far  -Healthy eating and weight loss helped a lot  -Will start a new medication for BP called spironolactine 12.5 mg daily    Who do I call with any questions after my visit?  Please be in touch if there are any further questions that arise following today's visit.  There are multiple ways to contact your nephrology care team.       During business hours, you may reach your Nephrology Care Team or schedule or reschedule an appointment or lab at [phone].       If you need to schedule imaging, please call [phone].   To schedule a COVID test, please call [phone].     You can always send a secure message through 20x200. 20x200 messages are answered by your nurse or doctor typically within 24-48 hours. Please allow extra time on weekends and holidays.       For urgent/emergent questions after business hours, you may reach the on-call Nephrology Fellow by contacting the [facility]  at [phone].     How will I get the results of any tests ordered?    You will receive all of your results.  If you have signed up for 20x200, any tests ordered at your visit will be available to you once resulted on [employer]. Typically the physician reviews them and may or may not make further recommendations. If there are urgent results that require a change in your care plan, your physician or nurse will call you to discuss the next steps. If you are not on [employer], a letter may be generated and mailed to you with your results.

## 2023-08-30 NOTE — NURSING NOTE
"Chief Complaint   Patient presents with    RECHECK     Follow up with hypertension     BP (!) 194/113   Pulse 88   Ht 1.803 m (5' 11\")   Wt 118.8 kg (261 lb 12.8 oz)   BMI 36.51 kg/m    Mattie Singh CMA on 8/30/2023 at 3:07 PM    "

## 2023-08-31 LAB
ALBUMIN SERPL ELPH-MCNC: 3.7 G/DL (ref 3.7–5.1)
ALPHA1 GLOB SERPL ELPH-MCNC: 0.3 G/DL (ref 0.2–0.4)
ALPHA2 GLOB SERPL ELPH-MCNC: 0.9 G/DL (ref 0.5–0.9)
B-GLOBULIN SERPL ELPH-MCNC: 1 G/DL (ref 0.6–1)
GAMMA GLOB SERPL ELPH-MCNC: 1.3 G/DL (ref 0.7–1.6)
KAPPA LC FREE SER-MCNC: 8.37 MG/DL (ref 0.33–1.94)
KAPPA LC FREE/LAMBDA FREE SER NEPH: 1.82 {RATIO} (ref 0.26–1.65)
LAMBDA LC FREE SERPL-MCNC: 4.59 MG/DL (ref 0.57–2.63)
M PROTEIN SERPL ELPH-MCNC: 0 G/DL
PROT PATTERN SERPL ELPH-IMP: NORMAL
PROT PATTERN SERPL IFE-IMP: NORMAL
TOTAL PROTEIN SERUM FOR ELP: 7.1 G/DL (ref 6.4–8.3)

## 2023-09-01 LAB
ALDOST SERPL-MCNC: 29.7 NG/DL (ref 0–31)
MAYO MISC RESULT: NORMAL

## 2023-09-02 LAB — RENIN PLAS-CCNC: 0.4 NG/ML/HR

## 2023-09-11 LAB — ALDOST/RENIN PLAS-RTO: 74.3 {RATIO} (ref 0–25)

## 2023-09-13 NOTE — TELEPHONE ENCOUNTER
Patient Quality Outreach    Patient is due for the following:   Diabetes -  Diabetic Follow-Up Visit  Colon Cancer Screening    Next Steps:   Schedule a office visit for diabetic check up    Type of outreach:    Phone, left message for patient/parent to call back.    Next Steps:  Reach out within 90 days via Phone.    Max number of attempts reached: Yes. Will try again in 90 days if patient still on fail list.    Questions for provider review:    None           Marisela Milligan, FARHAN  Chart routed to chart closed.

## 2023-09-14 ENCOUNTER — TELEPHONE (OUTPATIENT)
Dept: NEPHROLOGY | Facility: CLINIC | Age: 53
End: 2023-09-14
Payer: COMMERCIAL

## 2023-09-14 NOTE — TELEPHONE ENCOUNTER
LVM // pt due for 14 week follow up for Return Neph with Dr. Jesus around 12.6.23 with labs prior // first attempt, AN 9.14.23

## 2023-09-25 ENCOUNTER — TELEPHONE (OUTPATIENT)
Dept: NEPHROLOGY | Facility: CLINIC | Age: 53
End: 2023-09-25
Payer: COMMERCIAL

## 2023-09-25 NOTE — TELEPHONE ENCOUNTER
LVM & sent letter // pt due for 14 week follow up for Return Neph with Dr. Jesus around 12.6.23 with labs prior // second attempt, AN 9.25.23

## 2023-09-25 NOTE — TELEPHONE ENCOUNTER
[facility] Call Center    Phone Message    May a detailed message be left on voicemail: yes     Reason for Call: Other: Pt's significant other, Alda called back about the messages to schedule. They have not returned the calls because they have yet to hear back about any results and labs done the last time. They had been expecting something to have been mailed to them or a phone call to go over his current status. Alda and Pt would like a call from the care team about the last visit and they will then schedule the next follow up. Please review and call pt back. Thank you!     Action Taken: Message routed to:  [facility] Neph    Travel Screening: Not Applicable

## 2023-09-27 NOTE — TELEPHONE ENCOUNTER
Writer called patient regarding message and clarified that patient is waiting to hear about [place] lab results. Will send to Dr Jesus for follow up.    Writer mentioned sig other is not on file for consent to communicate. Patient aware and asked that next time patient has appointment he ensure to get that form signed.    Patient reports he is not on my chart that often so unable to send from that way.    Scheduled patient for 12.20 per request of patient  Maia Lobo LPN  Nephrology  [phone]

## 2023-09-29 ENCOUNTER — TELEPHONE (OUTPATIENT)
Dept: FAMILY MEDICINE | Facility: CLINIC | Age: 53
End: 2023-09-29
Payer: COMMERCIAL

## 2023-10-05 NOTE — TELEPHONE ENCOUNTER
Call to patient and informed him of recommendations per Dr Jesus that [place] tests were negative and will follow up in December. No further questions at this time  Maia Lobo LPN  Nephrology  [phone]

## 2023-10-24 ENCOUNTER — TRANSFERRED RECORDS (OUTPATIENT)
Dept: MULTI SPECIALTY CLINIC | Facility: CLINIC | Age: 53
End: 2023-10-24

## 2023-10-24 LAB — RETINOPATHY: NORMAL

## 2023-10-27 DIAGNOSIS — E78.5 HYPERLIPIDEMIA LDL GOAL <100: ICD-10-CM

## 2023-10-27 RX ORDER — SIMVASTATIN 20 MG
20 TABLET ORAL AT BEDTIME
Qty: 90 TABLET | Refills: 3 | OUTPATIENT
Start: 2023-10-27

## 2023-12-15 ENCOUNTER — OFFICE VISIT (OUTPATIENT)
Dept: FAMILY MEDICINE | Facility: CLINIC | Age: 53
End: 2023-12-15
Payer: COMMERCIAL

## 2023-12-15 ENCOUNTER — TELEPHONE (OUTPATIENT)
Dept: FAMILY MEDICINE | Facility: CLINIC | Age: 53
End: 2023-12-15

## 2023-12-15 VITALS
TEMPERATURE: 98.4 F | HEART RATE: 98 BPM | DIASTOLIC BLOOD PRESSURE: 96 MMHG | HEIGHT: 71 IN | OXYGEN SATURATION: 99 % | RESPIRATION RATE: 18 BRPM | WEIGHT: 273.38 LBS | BODY MASS INDEX: 38.27 KG/M2 | SYSTOLIC BLOOD PRESSURE: 178 MMHG

## 2023-12-15 DIAGNOSIS — Z12.11 SCREEN FOR COLON CANCER: ICD-10-CM

## 2023-12-15 DIAGNOSIS — I10 HYPERTENSION, ESSENTIAL: ICD-10-CM

## 2023-12-15 DIAGNOSIS — E11.319 TYPE 2 DIABETES MELLITUS WITH RETINOPATHY, WITHOUT LONG-TERM CURRENT USE OF INSULIN, MACULAR EDEMA PRESENCE UNSPECIFIED, UNSPECIFIED LATERALITY, UNSPECIFIED RETINOPATHY SEVERITY (H): Primary | ICD-10-CM

## 2023-12-15 DIAGNOSIS — R80.9 NEPHROTIC RANGE PROTEINURIA: ICD-10-CM

## 2023-12-15 DIAGNOSIS — Z79.4 TYPE 2 DIABETES MELLITUS WITH STAGE 1 CHRONIC KIDNEY DISEASE, WITH LONG-TERM CURRENT USE OF INSULIN (H): ICD-10-CM

## 2023-12-15 DIAGNOSIS — R53.83 FATIGUE, UNSPECIFIED TYPE: ICD-10-CM

## 2023-12-15 DIAGNOSIS — E11.22 TYPE 2 DIABETES MELLITUS WITH STAGE 1 CHRONIC KIDNEY DISEASE, WITH LONG-TERM CURRENT USE OF INSULIN (H): ICD-10-CM

## 2023-12-15 DIAGNOSIS — N18.1 TYPE 2 DIABETES MELLITUS WITH STAGE 1 CHRONIC KIDNEY DISEASE, WITH LONG-TERM CURRENT USE OF INSULIN (H): ICD-10-CM

## 2023-12-15 DIAGNOSIS — I10 ESSENTIAL HYPERTENSION WITH GOAL BLOOD PRESSURE LESS THAN 140/90: ICD-10-CM

## 2023-12-15 DIAGNOSIS — E66.9 OBESITY, UNSPECIFIED OBESITY SEVERITY, UNSPECIFIED OBESITY TYPE: ICD-10-CM

## 2023-12-15 DIAGNOSIS — E11.8 DM (DIABETES MELLITUS) WITH COMPLICATIONS (H): ICD-10-CM

## 2023-12-15 DIAGNOSIS — E78.5 HYPERLIPIDEMIA LDL GOAL <100: ICD-10-CM

## 2023-12-15 DIAGNOSIS — I12.9 HYPERTENSION, RENAL: ICD-10-CM

## 2023-12-15 DIAGNOSIS — Z12.5 SCREENING FOR PROSTATE CANCER: ICD-10-CM

## 2023-12-15 LAB
ALBUMIN SERPL BCG-MCNC: 3.8 G/DL (ref 3.5–5.2)
ALP SERPL-CCNC: 71 U/L (ref 40–150)
ALT SERPL W P-5'-P-CCNC: 16 U/L (ref 0–70)
ANION GAP SERPL CALCULATED.3IONS-SCNC: 12 MMOL/L (ref 7–15)
AST SERPL W P-5'-P-CCNC: 16 U/L (ref 0–45)
BASOPHILS # BLD AUTO: 0 10E3/UL (ref 0–0.2)
BASOPHILS NFR BLD AUTO: 1 %
BILIRUB SERPL-MCNC: 0.3 MG/DL
BUN SERPL-MCNC: 21.2 MG/DL (ref 6–20)
CALCIUM SERPL-MCNC: 9 MG/DL (ref 8.6–10)
CHLORIDE SERPL-SCNC: 101 MMOL/L (ref 98–107)
CREAT SERPL-MCNC: 1.4 MG/DL (ref 0.67–1.17)
DEPRECATED HCO3 PLAS-SCNC: 24 MMOL/L (ref 22–29)
EGFRCR SERPLBLD CKD-EPI 2021: 59 ML/MIN/1.73M2
EOSINOPHIL # BLD AUTO: 0.3 10E3/UL (ref 0–0.7)
EOSINOPHIL NFR BLD AUTO: 5 %
ERYTHROCYTE [DISTWIDTH] IN BLOOD BY AUTOMATED COUNT: 12.9 % (ref 10–15)
GLUCOSE SERPL-MCNC: 264 MG/DL (ref 70–99)
HBA1C MFR BLD: 11 % (ref 0–5.6)
HCT VFR BLD AUTO: 35.2 % (ref 40–53)
HGB BLD-MCNC: 12 G/DL (ref 13.3–17.7)
IMM GRANULOCYTES # BLD: 0 10E3/UL
IMM GRANULOCYTES NFR BLD: 1 %
LYMPHOCYTES # BLD AUTO: 1.3 10E3/UL (ref 0.8–5.3)
LYMPHOCYTES NFR BLD AUTO: 20 %
MCH RBC QN AUTO: 28.7 PG (ref 26.5–33)
MCHC RBC AUTO-ENTMCNC: 34.1 G/DL (ref 31.5–36.5)
MCV RBC AUTO: 84 FL (ref 78–100)
MONOCYTES # BLD AUTO: 0.4 10E3/UL (ref 0–1.3)
MONOCYTES NFR BLD AUTO: 7 %
NEUTROPHILS # BLD AUTO: 4.3 10E3/UL (ref 1.6–8.3)
NEUTROPHILS NFR BLD AUTO: 67 %
PLATELET # BLD AUTO: 212 10E3/UL (ref 150–450)
POTASSIUM SERPL-SCNC: 4.1 MMOL/L (ref 3.4–5.3)
PROT SERPL-MCNC: 7.1 G/DL (ref 6.4–8.3)
PSA SERPL DL<=0.01 NG/ML-MCNC: 0.23 NG/ML (ref 0–3.5)
RBC # BLD AUTO: 4.18 10E6/UL (ref 4.4–5.9)
SODIUM SERPL-SCNC: 137 MMOL/L (ref 135–145)
TSH SERPL DL<=0.005 MIU/L-ACNC: 2.45 UIU/ML (ref 0.3–4.2)
WBC # BLD AUTO: 6.3 10E3/UL (ref 4–11)

## 2023-12-15 PROCEDURE — 99214 OFFICE O/P EST MOD 30 MIN: CPT | Performed by: FAMILY MEDICINE

## 2023-12-15 PROCEDURE — 84443 ASSAY THYROID STIM HORMONE: CPT | Performed by: FAMILY MEDICINE

## 2023-12-15 PROCEDURE — 83036 HEMOGLOBIN GLYCOSYLATED A1C: CPT | Performed by: FAMILY MEDICINE

## 2023-12-15 PROCEDURE — 36415 COLL VENOUS BLD VENIPUNCTURE: CPT | Performed by: FAMILY MEDICINE

## 2023-12-15 PROCEDURE — G0103 PSA SCREENING: HCPCS | Performed by: FAMILY MEDICINE

## 2023-12-15 PROCEDURE — 80053 COMPREHEN METABOLIC PANEL: CPT | Performed by: FAMILY MEDICINE

## 2023-12-15 PROCEDURE — 85025 COMPLETE CBC W/AUTO DIFF WBC: CPT | Performed by: FAMILY MEDICINE

## 2023-12-15 RX ORDER — HYDROCHLOROTHIAZIDE 25 MG/1
25 TABLET ORAL DAILY
Qty: 90 TABLET | Refills: 3 | Status: SHIPPED | OUTPATIENT
Start: 2023-12-15 | End: 2024-09-26

## 2023-12-15 RX ORDER — AMLODIPINE BESYLATE 10 MG/1
10 TABLET ORAL DAILY
Qty: 90 TABLET | Refills: 3 | Status: SHIPPED | OUTPATIENT
Start: 2023-12-15 | End: 2024-09-26

## 2023-12-15 RX ORDER — SIMVASTATIN 40 MG
40 TABLET ORAL AT BEDTIME
Qty: 90 TABLET | Refills: 3 | Status: SHIPPED | OUTPATIENT
Start: 2023-12-15 | End: 2024-09-26

## 2023-12-15 RX ORDER — CARVEDILOL 25 MG/1
25 TABLET ORAL 2 TIMES DAILY WITH MEALS
Qty: 180 TABLET | Refills: 1 | Status: SHIPPED | OUTPATIENT
Start: 2023-12-15 | End: 2024-06-18

## 2023-12-15 RX ORDER — LISINOPRIL 40 MG/1
40 TABLET ORAL DAILY
Qty: 90 TABLET | Refills: 3 | Status: SHIPPED | OUTPATIENT
Start: 2023-12-15 | End: 2024-09-26

## 2023-12-15 RX ORDER — LIRAGLUTIDE 6 MG/ML
1.8 INJECTION SUBCUTANEOUS DAILY
Qty: 9 ML | Refills: 1 | Status: SHIPPED | OUTPATIENT
Start: 2023-12-15 | End: 2024-02-13

## 2023-12-15 RX ORDER — SPIRONOLACTONE 25 MG/1
12.5 TABLET ORAL DAILY
Qty: 45 TABLET | Refills: 3 | Status: SHIPPED | OUTPATIENT
Start: 2023-12-15 | End: 2024-09-26

## 2023-12-15 RX ORDER — GLIPIZIDE 10 MG/1
TABLET ORAL
Qty: 180 TABLET | Refills: 1 | Status: SHIPPED | OUTPATIENT
Start: 2023-12-15 | End: 2024-07-19

## 2023-12-15 ASSESSMENT — PAIN SCALES - GENERAL: PAINLEVEL: NO PAIN (0)

## 2023-12-15 NOTE — LETTER
December 15, 2023      Davie BOLIVAR Vin  [address] [facility] [address]        Dear ,    We are writing to inform you of your test results. Your diabetes marker is extremely high. Please follow up with your diabetes provider as soon as you can.      Resulted Orders   Hemoglobin A1c   Result Value Ref Range    Hemoglobin A1C 11.0 (H) 0.0 - 5.6 %       If you have any questions or concerns, please call the clinic at the number listed above.       Sincerely,      Ray Jesus MD

## 2023-12-15 NOTE — LETTER
December 18, 2023    Davie Banuelos  [address] [facility] [address]          Dear ,    We are writing to inform you of your test results.    Unfortunately diabetes test ( hemoglobin a1c ) is very high.  I put in a referral to have you see the diabetes educator.  Please call [phone] to schedule.     Other labs okay / stable.     Resulted Orders   Hemoglobin A1c   Result Value Ref Range    Hemoglobin A1C 11.0 (H) 0.0 - 5.6 %   Comprehensive metabolic panel   Result Value Ref Range    Sodium 137 135 - 145 mmol/L      Comment:      Reference intervals for this test were updated on 09/26/2023 to more accurately reflect our healthy population. There may be differences in the flagging of prior results with similar values performed with this method. Interpretation of those prior results can be made in the context of the updated reference intervals.     Potassium 4.1 3.4 - 5.3 mmol/L    Carbon Dioxide (CO2) 24 22 - 29 mmol/L    Anion Gap 12 7 - 15 mmol/L    Urea Nitrogen 21.2 (H) 6.0 - 20.0 mg/dL    Creatinine 1.40 (H) 0.67 - 1.17 mg/dL    GFR Estimate 59 (L) >60 mL/min/1.73m2    Calcium 9.0 8.6 - 10.0 mg/dL    Chloride 101 98 - 107 mmol/L    Glucose 264 (H) 70 - 99 mg/dL    Alkaline Phosphatase 71 40 - 150 U/L      Comment:      Reference intervals for this test were updated on 11/14/2023 to more accurately reflect our healthy population. There may be differences in the flagging of prior results with similar values performed with this method. Interpretation of those prior results can be made in the context of the updated reference intervals.    AST 16 0 - 45 U/L      Comment:      Reference intervals for this test were updated on 6/12/2023 to more accurately reflect our healthy population. There may be differences in the flagging of prior results with similar values performed with this method. Interpretation of those prior results can be made in the context of the updated reference intervals.     ALT 16 0 - 70 U/L      Comment:      Reference intervals for this test were updated on 6/12/2023 to more accurately reflect our healthy population. There may be differences in the flagging of prior results with similar values performed with this method. Interpretation of those prior results can be made in the context of the updated reference intervals.      Protein Total 7.1 6.4 - 8.3 g/dL    Albumin 3.8 3.5 - 5.2 g/dL    Bilirubin Total 0.3 <=1.2 mg/dL   Prostate Specific Antigen Screen   Result Value Ref Range    Prostate Specific Antigen Screen 0.23 0.00 - 3.50 ng/mL    Narrative    This result is obtained using the Roche Elecsys total PSA method on the lizzeth e801 immunoassay analyzer. Results obtained with different assay methods or kits cannot be used interchangeably.   TSH with free T4 reflex   Result Value Ref Range    TSH 2.45 0.30 - 4.20 uIU/mL   CBC with platelets and differential   Result Value Ref Range    WBC Count 6.3 4.0 - 11.0 10e3/uL    RBC Count 4.18 (L) 4.40 - 5.90 10e6/uL    Hemoglobin 12.0 (L) 13.3 - 17.7 g/dL    Hematocrit 35.2 (L) 40.0 - 53.0 %    MCV 84 78 - 100 fL    MCH 28.7 26.5 - 33.0 pg    MCHC 34.1 31.5 - 36.5 g/dL    RDW 12.9 10.0 - 15.0 %    Platelet Count 212 150 - 450 10e3/uL    % Neutrophils 67 %    % Lymphocytes 20 %    % Monocytes 7 %    % Eosinophils 5 %    % Basophils 1 %    % Immature Granulocytes 1 %    Absolute Neutrophils 4.3 1.6 - 8.3 10e3/uL    Absolute Lymphocytes 1.3 0.8 - 5.3 10e3/uL    Absolute Monocytes 0.4 0.0 - 1.3 10e3/uL    Absolute Eosinophils 0.3 0.0 - 0.7 10e3/uL    Absolute Basophils 0.0 0.0 - 0.2 10e3/uL    Absolute Immature Granulocytes 0.0 <=0.4 10e3/uL     If you have any questions or concerns, please call the clinic at the number listed above.     Sincerely,    Sean Dougherty MD

## 2023-12-15 NOTE — PROGRESS NOTES
"Hosea Broderick is a 53 year old, presenting for the following health issues:  Diabetes        12/15/2023     7:00 AM   Additional Questions   Roomed by Marisela Milligan       History of Present Illness       Diabetes:   He presents for follow up of diabetes.  He is checking home blood glucose one time daily.   He checks blood glucose before meals.  Blood glucose is sometimes over 200 and sometimes under 70. He is aware of hypoglycemia symptoms including shakiness.   He is concerned about other.    He is not experiencing numbness or burning in feet, excessive thirst, blurry vision, weight changes or redness, sores or blisters on feet. The patient has had a diabetic eye exam in the last 12 months. Eye exam performed on 10242023. [facility].        Hypertension: He presents for follow up of hypertension.  He does check blood pressure  regularly outside of the clinic. Outside blood pressures have been over 140/90. He follows a low salt diet.     He eats 2-3 servings of fruits and vegetables daily.He consumes 0 sweetened beverage(s) daily.He exercises with enough effort to increase his heart rate 30 to 60 minutes per day.  He exercises with enough effort to increase his heart rate 5 days per week.   He is taking medications regularly.             Review of Systems   Hard to get meds    Maybe change pharmacy    Did night shift for a while    Now back to normal work  schedule    Urinating fine    High  100s mostly    Some low 100s    200 this am but out of some meds    Quit processed foods for a while          Objective    BP (!) 202/105 (BP Location: Left arm, Patient Position: Chair, Cuff Size: Adult Large)   Pulse 98   Temp 98.4  F (36.9  C) (Temporal)   Resp 18   Ht 1.803 m (5' 11\")   Wt 124 kg (273 lb 6 oz)   SpO2 99%   BMI 38.13 kg/m    Body mass index is 38.13 kg/m .  Physical Exam  Constitutional:       Appearance: He is well-developed.   HENT:      Head: Normocephalic " and atraumatic.   Eyes:      Conjunctiva/sclera: Conjunctivae normal.   Neck:      Vascular: No carotid bruit.   Cardiovascular:      Rate and Rhythm: Normal rate and regular rhythm.      Heart sounds: Normal heart sounds.   Pulmonary:      Effort: Pulmonary effort is normal. No respiratory distress.      Breath sounds: Normal breath sounds.   Neurological:      Mental Status: He is alert and oriented to person, place, and time.      Cranial Nerves: No cranial nerve deficit.   Psychiatric:         Speech: Speech normal.         Behavior: Behavior normal.           ASSESSMENT / PLAN:  (E11.319) Type 2 diabetes mellitus with retinopathy, without long-term current use of insulin, macular edema presence unspecified, unspecified laterality, unspecified retinopathy severity (H)  (primary encounter diagnosis)  Comment: recheck labs.  Patient has been out of some meds for a while. He wants to change all meds to our pharmacy.  Plan: Adult Eye  Referral, glipiZIDE         (GLUCOTROL) 10 MG tablet, liraglutide (VICTOZA         PEN) 18 MG/3ML solution, metFORMIN (GLUCOPHAGE)        1000 MG tablet, blood glucose (NO BRAND         SPECIFIED) test strip, Hemoglobin A1c             (Z12.11) Screen for colon cancer  Comment: fit test   Plan: Fecal colorectal cancer screen FIT - Future         (S+30)             (I10) Essential hypertension with goal blood pressure less than 140/90  Comment: blood pressure high, on multiple meds.    Plan: amLODIPine (NORVASC) 10 MG tablet, carvedilol         (COREG) 25 MG tablet        See kidney doctor in follow up as planned     (E11.22,  N18.1,  Z79.4) Type 2 diabetes mellitus with stage 1 chronic kidney disease, with long-term current use of insulin (H)  Comment: refill   Plan: empagliflozin (JARDIANCE) 25 MG TABS tablet             (I12.9) Hypertension, renal  Comment: refill   Plan: hydrochlorothiazide (HYDRODIURIL) 25 MG tablet,        lisinopril (ZESTRIL) 40 MG tablet,          Comprehensive metabolic panel             (E78.5) Hyperlipidemia LDL goal <100  Comment: refill   Plan: simvastatin (ZOCOR) 40 MG tablet             (I10) Hypertension, essential  Comment: refill   Plan: spironolactone (ALDACTONE) 25 MG tablet             (Z12.5) Screening for prostate cancer  Comment: psa   Plan: Prostate Specific Antigen Screen             (R53.83) Fatigue, unspecified type  Comment: check  Plan: CBC with Platelets & Differential, TSH with         free T4 reflex             (E11.8) DM (diabetes mellitus) with complications (H)  Comment: as above   Plan:  as above     (E66.9) Obesity, unspecified obesity severity, unspecified obesity type  Comment: chronic   Plan: keep working on healthy diet/exercise and wt loss    (R80.9) Nephrotic range proteinuria  Comment: appreciate nephrology help/ management  Plan: as above       I reviewed the patient's medications, allergies, medical history, family history, and social history.    Sean Dougherty MD

## 2023-12-15 NOTE — TELEPHONE ENCOUNTER
Patient Quality Outreach    Patient is due for the following:   Diabetes -  Eye Exam    Next Steps:   Chart routed to abstraction.    Patient had eye exam 10/01/2023 at [facility]    Type of outreach:    Chart review performed, no outreach needed.    Next Steps:  Reach out within 90 days via Phone.    Max number of attempts reached: Yes. Will try again in 90 days if patient still on fail list.    Questions for provider review:    None           Marisela Milligan, FARHAN  Chart routed to chart closed.

## 2023-12-15 NOTE — PATIENT INSTRUCTIONS
Keep working on healthy diet/exercise and weight loss    Follow up with kidney doctor    We will send you lab results    Talk with our pharmacy

## 2023-12-16 ENCOUNTER — TELEPHONE (OUTPATIENT)
Dept: FAMILY MEDICINE | Facility: CLINIC | Age: 53
End: 2023-12-16
Payer: COMMERCIAL

## 2023-12-16 DIAGNOSIS — E11.9 TYPE 2 DIABETES, HBA1C GOAL < 7% (H): Primary | ICD-10-CM

## 2023-12-17 NOTE — RESULT ENCOUNTER NOTE
Unfortunately diabetes test ( hemoglobin a1c ) is very high.  I put in a referral to have you see the diabetes educator.  Please call [phone] to schedule.    Other labs okay / stable.    Sean Dougherty MD

## 2023-12-22 ENCOUNTER — TELEPHONE (OUTPATIENT)
Dept: NEPHROLOGY | Facility: CLINIC | Age: 53
End: 2023-12-22
Payer: COMMERCIAL

## 2023-12-22 NOTE — TELEPHONE ENCOUNTER
LVM, No My Chart to reschedule Dr Jesus appointment on 01.31.2024 with labs prior // first attempt 12.22.2023 [facility]

## 2024-03-20 ENCOUNTER — TELEPHONE (OUTPATIENT)
Dept: FAMILY MEDICINE | Facility: CLINIC | Age: 54
End: 2024-03-20
Payer: COMMERCIAL

## 2024-03-20 NOTE — LETTER
March 20, 2024    To  Davie Banuelos  [address] [facility] [address]    Your team at [facility] cares about your health. We have reviewed your chart and based on our findings; we are making the following recommendations to better manage your health.     You are in particular need of attention regarding the following:     HYPERTENSION FOLLOW UP: Office Visit  Call or MyChart message your clinic to schedule a colonoscopy, schedule/ a FIT Test, or order a Cologuard test. If you are unsure what type of test you need, please call your clinic and speak to clinic staff.   Colon cancer is now the second leading cause of cancer-related deaths in the [place] for both men and women and there are over 130,000 new cases and 50,000 deaths per year from colon cancer. Colonoscopies can prevent 90-95% of these deaths. Problem lesions can be removed before they ever become cancer. This test is not only looking for cancer, but also getting rid of precancerous lesions.   If you are under/uninsured, we recommend you contact the UpOut Program.UpOut is a free colorectal cancer screening program that provides colonoscopies for eligible under/uninsured [place] men and women. If you are interested in receiving a free colonoscopy, please call UpOut at t [phone] (mention code [employer]) to see if you're eligible. Please have them send us the results.   Contact your clinic to schedule/ your FIT Test.     If you have already completed these items, please contact the clinic via phone or   [employer] so your care team can review and update your records. Thank you for   choosing [facility] Clinics for your healthcare needs. For any questions,   concerns, or to schedule an appointment please contact our clinic.    Healthy Regards,      Your [facility] Care Team

## 2024-03-20 NOTE — TELEPHONE ENCOUNTER
Patient Quality Outreach    Patient is due for the following:   Diabetes -  Foot Exam  Hypertension -  BP check  Colon Cancer Screening    Next Steps:   Schedule a office visit for Blood pressure recheck    Type of outreach:    Sent letter.    Next Steps:  Reach out within 90 days via Phone.    Max number of attempts reached: Yes. Will try again in 90 days if patient still on fail list.    Questions for provider review:    None           Marisela Milligan, FARHAN  Chart routed to chart closed.

## 2024-04-22 ENCOUNTER — PATIENT OUTREACH (OUTPATIENT)
Dept: CARE COORDINATION | Facility: CLINIC | Age: 54
End: 2024-04-22
Payer: COMMERCIAL

## 2024-05-06 ENCOUNTER — PATIENT OUTREACH (OUTPATIENT)
Dept: CARE COORDINATION | Facility: CLINIC | Age: 54
End: 2024-05-06
Payer: COMMERCIAL

## 2024-07-19 DIAGNOSIS — E11.319 TYPE 2 DIABETES MELLITUS WITH RETINOPATHY, WITHOUT LONG-TERM CURRENT USE OF INSULIN, MACULAR EDEMA PRESENCE UNSPECIFIED, UNSPECIFIED LATERALITY, UNSPECIFIED RETINOPATHY SEVERITY (H): ICD-10-CM

## 2024-07-19 RX ORDER — GLIPIZIDE 10 MG/1
TABLET ORAL
Qty: 180 TABLET | Refills: 0 | Status: SHIPPED | OUTPATIENT
Start: 2024-07-19 | End: 2024-09-26

## 2024-09-26 ENCOUNTER — OFFICE VISIT (OUTPATIENT)
Dept: FAMILY MEDICINE | Facility: CLINIC | Age: 54
End: 2024-09-26
Payer: COMMERCIAL

## 2024-09-26 VITALS
RESPIRATION RATE: 18 BRPM | OXYGEN SATURATION: 98 % | TEMPERATURE: 97.8 F | HEIGHT: 71 IN | SYSTOLIC BLOOD PRESSURE: 160 MMHG | BODY MASS INDEX: 37.82 KG/M2 | DIASTOLIC BLOOD PRESSURE: 86 MMHG | WEIGHT: 270.13 LBS | HEART RATE: 86 BPM

## 2024-09-26 DIAGNOSIS — I10 HYPERTENSION, ESSENTIAL: ICD-10-CM

## 2024-09-26 DIAGNOSIS — Z12.5 SCREENING FOR PROSTATE CANCER: ICD-10-CM

## 2024-09-26 DIAGNOSIS — E11.319 TYPE 2 DIABETES MELLITUS WITH RETINOPATHY, WITHOUT LONG-TERM CURRENT USE OF INSULIN, MACULAR EDEMA PRESENCE UNSPECIFIED, UNSPECIFIED LATERALITY, UNSPECIFIED RETINOPATHY SEVERITY (H): ICD-10-CM

## 2024-09-26 DIAGNOSIS — R80.9 PROTEINURIA, UNSPECIFIED TYPE: ICD-10-CM

## 2024-09-26 DIAGNOSIS — N18.31 STAGE 3A CHRONIC KIDNEY DISEASE (H): ICD-10-CM

## 2024-09-26 DIAGNOSIS — I10 ESSENTIAL HYPERTENSION WITH GOAL BLOOD PRESSURE LESS THAN 140/90: ICD-10-CM

## 2024-09-26 DIAGNOSIS — E11.22 TYPE 2 DIABETES MELLITUS WITH CHRONIC KIDNEY DISEASE, WITHOUT LONG-TERM CURRENT USE OF INSULIN, UNSPECIFIED CKD STAGE (H): ICD-10-CM

## 2024-09-26 DIAGNOSIS — Z00.00 ROUTINE GENERAL MEDICAL EXAMINATION AT A HEALTH CARE FACILITY: Primary | ICD-10-CM

## 2024-09-26 DIAGNOSIS — E78.5 HYPERLIPIDEMIA LDL GOAL <100: ICD-10-CM

## 2024-09-26 DIAGNOSIS — Z12.11 SCREEN FOR COLON CANCER: ICD-10-CM

## 2024-09-26 DIAGNOSIS — I12.9 HYPERTENSION, RENAL: ICD-10-CM

## 2024-09-26 DIAGNOSIS — R53.83 FATIGUE, UNSPECIFIED TYPE: ICD-10-CM

## 2024-09-26 LAB
ALBUMIN SERPL BCG-MCNC: 4.2 G/DL (ref 3.5–5.2)
ALP SERPL-CCNC: 78 U/L (ref 40–150)
ALT SERPL W P-5'-P-CCNC: 19 U/L (ref 0–70)
ANION GAP SERPL CALCULATED.3IONS-SCNC: 14 MMOL/L (ref 7–15)
AST SERPL W P-5'-P-CCNC: 19 U/L (ref 0–45)
BASOPHILS # BLD AUTO: 0 10E3/UL (ref 0–0.2)
BASOPHILS NFR BLD AUTO: 1 %
BILIRUB SERPL-MCNC: 0.3 MG/DL
BUN SERPL-MCNC: 33.5 MG/DL (ref 6–20)
CALCIUM SERPL-MCNC: 9.2 MG/DL (ref 8.8–10.4)
CHLORIDE SERPL-SCNC: 101 MMOL/L (ref 98–107)
CHOLEST SERPL-MCNC: 155 MG/DL
CREAT SERPL-MCNC: 1.76 MG/DL (ref 0.67–1.17)
CREAT UR-MCNC: 53.5 MG/DL
EGFRCR SERPLBLD CKD-EPI 2021: 45 ML/MIN/1.73M2
EOSINOPHIL # BLD AUTO: 0.1 10E3/UL (ref 0–0.7)
EOSINOPHIL NFR BLD AUTO: 2 %
ERYTHROCYTE [DISTWIDTH] IN BLOOD BY AUTOMATED COUNT: 13.2 % (ref 10–15)
EST. AVERAGE GLUCOSE BLD GHB EST-MCNC: 212 MG/DL
FASTING STATUS PATIENT QL REPORTED: YES
FASTING STATUS PATIENT QL REPORTED: YES
GLUCOSE SERPL-MCNC: 180 MG/DL (ref 70–99)
HBA1C MFR BLD: 9 % (ref 0–5.6)
HCO3 SERPL-SCNC: 21 MMOL/L (ref 22–29)
HCT VFR BLD AUTO: 39.3 % (ref 40–53)
HDLC SERPL-MCNC: 34 MG/DL
HGB BLD-MCNC: 13 G/DL (ref 13.3–17.7)
IMM GRANULOCYTES # BLD: 0 10E3/UL
IMM GRANULOCYTES NFR BLD: 0 %
LDLC SERPL CALC-MCNC: 73 MG/DL
LYMPHOCYTES # BLD AUTO: 1.1 10E3/UL (ref 0.8–5.3)
LYMPHOCYTES NFR BLD AUTO: 15 %
MCH RBC QN AUTO: 29.1 PG (ref 26.5–33)
MCHC RBC AUTO-ENTMCNC: 33.1 G/DL (ref 31.5–36.5)
MCV RBC AUTO: 88 FL (ref 78–100)
MICROALBUMIN UR-MCNC: 613 MG/L
MICROALBUMIN/CREAT UR: 1145.79 MG/G CR (ref 0–17)
MONOCYTES # BLD AUTO: 0.5 10E3/UL (ref 0–1.3)
MONOCYTES NFR BLD AUTO: 7 %
NEUTROPHILS # BLD AUTO: 5.6 10E3/UL (ref 1.6–8.3)
NEUTROPHILS NFR BLD AUTO: 75 %
NONHDLC SERPL-MCNC: 121 MG/DL
PLATELET # BLD AUTO: 198 10E3/UL (ref 150–450)
POTASSIUM SERPL-SCNC: 4.6 MMOL/L (ref 3.4–5.3)
PROT SERPL-MCNC: 7.7 G/DL (ref 6.4–8.3)
PSA SERPL DL<=0.01 NG/ML-MCNC: 0.35 NG/ML (ref 0–3.5)
RBC # BLD AUTO: 4.46 10E6/UL (ref 4.4–5.9)
SODIUM SERPL-SCNC: 136 MMOL/L (ref 135–145)
TRIGL SERPL-MCNC: 238 MG/DL
TSH SERPL DL<=0.005 MIU/L-ACNC: 1.59 UIU/ML (ref 0.3–4.2)
WBC # BLD AUTO: 7.5 10E3/UL (ref 4–11)

## 2024-09-26 PROCEDURE — 80061 LIPID PANEL: CPT | Performed by: FAMILY MEDICINE

## 2024-09-26 PROCEDURE — 99396 PREV VISIT EST AGE 40-64: CPT | Performed by: FAMILY MEDICINE

## 2024-09-26 PROCEDURE — 84443 ASSAY THYROID STIM HORMONE: CPT | Performed by: FAMILY MEDICINE

## 2024-09-26 PROCEDURE — 36415 COLL VENOUS BLD VENIPUNCTURE: CPT | Performed by: FAMILY MEDICINE

## 2024-09-26 PROCEDURE — G0103 PSA SCREENING: HCPCS | Performed by: FAMILY MEDICINE

## 2024-09-26 PROCEDURE — 80053 COMPREHEN METABOLIC PANEL: CPT | Performed by: FAMILY MEDICINE

## 2024-09-26 PROCEDURE — 99213 OFFICE O/P EST LOW 20 MIN: CPT | Mod: 25 | Performed by: FAMILY MEDICINE

## 2024-09-26 PROCEDURE — 85025 COMPLETE CBC W/AUTO DIFF WBC: CPT | Performed by: FAMILY MEDICINE

## 2024-09-26 PROCEDURE — 82043 UR ALBUMIN QUANTITATIVE: CPT | Performed by: FAMILY MEDICINE

## 2024-09-26 PROCEDURE — 83036 HEMOGLOBIN GLYCOSYLATED A1C: CPT | Performed by: FAMILY MEDICINE

## 2024-09-26 PROCEDURE — 82570 ASSAY OF URINE CREATININE: CPT | Performed by: FAMILY MEDICINE

## 2024-09-26 RX ORDER — SPIRONOLACTONE 25 MG/1
25 TABLET ORAL DAILY
Qty: 90 TABLET | Refills: 3 | Status: SHIPPED | OUTPATIENT
Start: 2024-09-26

## 2024-09-26 RX ORDER — LANCETS
EACH MISCELLANEOUS
Qty: 100 EACH | Refills: 3 | Status: SHIPPED | OUTPATIENT
Start: 2024-09-26

## 2024-09-26 RX ORDER — LISINOPRIL 40 MG/1
40 TABLET ORAL DAILY
Qty: 90 TABLET | Refills: 3 | Status: SHIPPED | OUTPATIENT
Start: 2024-09-26

## 2024-09-26 RX ORDER — GLIPIZIDE 10 MG/1
TABLET ORAL
Qty: 180 TABLET | Refills: 1 | Status: SHIPPED | OUTPATIENT
Start: 2024-09-26

## 2024-09-26 RX ORDER — PEN NEEDLE, DIABETIC 32 GX 1/4"
1 NEEDLE, DISPOSABLE MISCELLANEOUS DAILY
Qty: 100 EACH | Refills: 0 | Status: SHIPPED | OUTPATIENT
Start: 2024-09-26

## 2024-09-26 RX ORDER — HYDROCHLOROTHIAZIDE 25 MG/1
25 TABLET ORAL DAILY
Qty: 90 TABLET | Refills: 3 | Status: SHIPPED | OUTPATIENT
Start: 2024-09-26

## 2024-09-26 RX ORDER — CARVEDILOL 25 MG/1
25 TABLET ORAL 2 TIMES DAILY WITH MEALS
Qty: 180 TABLET | Refills: 1 | Status: SHIPPED | OUTPATIENT
Start: 2024-09-26

## 2024-09-26 RX ORDER — LIRAGLUTIDE 6 MG/ML
1.8 INJECTION SUBCUTANEOUS DAILY
Qty: 9 ML | Refills: 1 | Status: SHIPPED | OUTPATIENT
Start: 2024-09-26

## 2024-09-26 RX ORDER — AMLODIPINE BESYLATE 10 MG/1
10 TABLET ORAL DAILY
Qty: 90 TABLET | Refills: 3 | Status: SHIPPED | OUTPATIENT
Start: 2024-09-26

## 2024-09-26 RX ORDER — SIMVASTATIN 40 MG
40 TABLET ORAL AT BEDTIME
Qty: 90 TABLET | Refills: 3 | Status: SHIPPED | OUTPATIENT
Start: 2024-09-26

## 2024-09-26 ASSESSMENT — PAIN SCALES - GENERAL: PAINLEVEL: NO PAIN (0)

## 2024-09-26 NOTE — PATIENT INSTRUCTIONS
Increase spirinolactone to 25 mg daily    Keep other meds as is    Keep working on healthy diet/exercise and weight loss    Decrease portion size    See diabetes educator    Also advise kidney specialist appointment ( nephrology )    Schedule eye appointment                   Patient Education   Preventive Care Advice   This is general advice given by our system to help you stay healthy. However, your care team may have specific advice just for you. Please talk to your care team about your preventive care needs.  Nutrition  Eat 5 or more servings of fruits and vegetables each day.  Try wheat bread, brown rice and whole grain pasta (instead of white bread, rice, and pasta).  Get enough calcium and vitamin D. Check the label on foods and aim for 100% of the RDA (recommended daily allowance).  Lifestyle  Exercise at least 150 minutes each week  (30 minutes a day, 5 days a week).  Do muscle strengthening activities 2 days a week. These help control your weight and prevent disease.  No smoking.  Wear sunscreen to prevent skin cancer.  Have a dental exam and cleaning every 6 months.  Yearly exams  See your health care team every year to talk about:  Any changes in your health.  Any medicines your care team has prescribed.  Preventive care, family planning, and ways to prevent chronic diseases.  Shots (vaccines)   HPV shots (up to age 26), if you've never had them before.  Hepatitis B shots (up to age 59), if you've never had them before.  COVID-19 shot: Get this shot when it's due.  Flu shot: Get a flu shot every year.  Tetanus shot: Get a tetanus shot every 10 years.  Pneumococcal, hepatitis A, and RSV shots: Ask your care team if you need these based on your risk.  Shingles shot (for age 50 and up)  General health tests  Diabetes screening:  Starting at age 35, Get screened for diabetes at least every 3 years.  If you are younger than age 35, ask your care team if you should be screened for diabetes.  Cholesterol  test: At age 39, start having a cholesterol test every 5 years, or more often if advised.  Bone density scan (DEXA): At age 50, ask your care team if you should have this scan for osteoporosis (brittle bones).  Hepatitis C: Get tested at least once in your life.  STIs (sexually transmitted infections)  Before age 24: Ask your care team if you should be screened for STIs.  After age 24: Get screened for STIs if you're at risk. You are at risk for STIs (including HIV) if:  You are sexually active with more than one person.  You don't use condoms every time.  You or a partner was diagnosed with a sexually transmitted infection.  If you are at risk for HIV, ask about PrEP medicine to prevent HIV.  Get tested for HIV at least once in your life, whether you are at risk for HIV or not.  Cancer screening tests  Cervical cancer screening: If you have a cervix, begin getting regular cervical cancer screening tests starting at age 21.  Breast cancer scan (mammogram): If you've ever had breasts, begin having regular mammograms starting at age 40. This is a scan to check for breast cancer.  Colon cancer screening: It is important to start screening for colon cancer at age 45.  Have a colonoscopy test every 10 years (or more often if you're at risk) Or, ask your provider about stool tests like a FIT test every year or Cologuard test every 3 years.  To learn more about your testing options, visit:   .  For help making a decision, visit:   https://bit.ly/cu11950.  Prostate cancer screening test: If you have a prostate, ask your care team if a prostate cancer screening test (PSA) at age 55 is right for you.  Lung cancer screening: If you are a current or former smoker ages 50 to 80, ask your care team if ongoing lung cancer screenings are right for you.  For informational purposes only. Not to replace the advice of your health care provider. Copyright   2023 [employer]. All rights reserved. Clinically reviewed by the [employer]  [facility] Transitions Program. NMRKT 321947 - REV 01/24.  Substance Use Disorder: Care Instructions  Overview     You can improve your life and health by stopping your use of alcohol or drugs. When you don't drink or use drugs, you may feel and sleep better. You may get along better with your family, friends, and coworkers. There are medicines and programs that can help with substance use disorder.  How can you care for yourself at home?  Here are some ways to help you stay sober and prevent relapse.  If you have been given medicine to help keep you sober or reduce your cravings, be sure to take it exactly as prescribed.  Talk to your doctor about programs that can help you stop using drugs or drinking alcohol.  Do not keep alcohol or drugs in your home.  Plan ahead. Think about what you'll say if other people ask you to drink or use drugs. Try not to spend time with people who drink or use drugs.  Use the time and money spent on drinking or drugs to do something that's important to you.  Preventing a relapse  Have a plan to deal with relapse. Learn to recognize changes in your thinking that lead you to drink or use drugs. Get help before you start to drink or use drugs again.  Try to stay away from situations, friends, or places that may lead you to drink or use drugs.  If you feel the need to drink alcohol or use drugs again, seek help right away. Call a trusted friend or family member. Some people get support from organizations such as Narcotics Anonymous or [employer] or from treatment facilities.  If you relapse, get help as soon as you can. Some people make a plan with another person that outlines what they want that person to do for them if they relapse. The plan usually includes how to handle the relapse and who to notify in case of relapse.  Don't give up. Remember that a relapse doesn't mean that you have failed. Use the experience to learn the triggers that lead you to drink or use drugs.  "Then quit again. Recovery is a lifelong process. Many people have several relapses before they are able to quit for good.  Follow-up care is a key part of your treatment and safety. Be sure to make and go to all appointments, and call your doctor if you are having problems. It's also a good idea to know your test results and keep a list of the medicines you take.  When should you call for help?   Call 911  anytime you think you may need emergency care. For example, call if you or someone else:    Has overdosed or has withdrawal signs. Be sure to tell the emergency workers that you are or someone else is using or trying to quit using drugs. Overdose or withdrawal signs may include:  Losing consciousness.  Seizure.  Seeing or hearing things that aren't there (hallucinations).     Is thinking or talking about suicide or harming others.   Where to get help 24 hours a day, 7 days a week   If you or someone you know talks about suicide, self-harm, a mental health crisis, a substance use crisis, or any other kind of emotional distress, get help right away. You can:    Call the Suicide and Crisis Lifeline at 988.     Call 8-801-552-TALK (1-539.354.2473).     Text HOME to 034923 to access the Crisis Text Line.   Consider saving these numbers in your phone.  Go to [employer].org for more information or to chat online.  Call your doctor now or seek immediate medical care if:    You are having withdrawal symptoms. These may include nausea or vomiting, sweating, shakiness, and anxiety.   Watch closely for changes in your health, and be sure to contact your doctor if:    You have a relapse.     You need more help or support to stop.   Where can you learn more?  Go to https://www.[employer]Contech Holdings.net/patiented  Enter H573 in the search box to learn more about \"Substance Use Disorder: Care Instructions.\"  Current as of: November 15, 2023               Content Version: 14.0    6927-3939 Healthwise, Incorporated.   Care instructions " adapted under license by your healthcare professional. If you have questions about a medical condition or this instruction, always ask your healthcare professional. Healthwise, Incorporated disclaims any warranty or liability for your use of this information.

## 2024-09-26 NOTE — PROGRESS NOTES
Preventive Care Visit  [facility]  Sean Dougherty MD, Family Medicine  Sep 26, 2024      Home blood pressure readings 140s over 80s avg , range 120s to 180s over 60s to 100ish     Glucose readings average low 100s    Home  diet okay    On the road sketchy      Walk a lot  at work     Active work    Sleeping better     Energy level better          Subjective   Davie is a 53 year old, presenting for the following:  Physical (Fasting)        9/26/2024     6:59 AM   Additional Questions   Roomed by Marisela Milligan        Health Care Directive  Patient does not have a Health Care Directive or Living Will: Discussed advance care planning with patient; however, patient declined at this time.    HPI        9/26/2024   General Health   How would you rate your overall physical health? (!) FAIR   Feel stress (tense, anxious, or unable to sleep) Not at all            9/26/2024   Nutrition   Three or more servings of calcium each day? Yes   Diet: Diabetic   How many servings of fruit and vegetables per day? (!) 2-3   How many sweetened beverages each day? 0-1            9/26/2024   Exercise   Days per week of moderate/strenous exercise 5 days            9/26/2024   Social Factors   Frequency of gathering with friends or relatives Twice a week   Worry food won't last until get money to buy more No   Food not last or not have enough money for food? No   Do you have housing? (Housing is defined as stable permanent housing and does not include staying ouside in a car, in a tent, in an abandoned building, in an overnight shelter, or couch-surfing.) Yes   Are you worried about losing your housing? No   Lack of transportation? No   Unable to get utilities (heat,electricity)? No            9/26/2024   Fall Risk   Fallen 2 or more times in the past year? No   Trouble with walking or balance? No             9/26/2024   Dental   Dentist two times every year? (!) NO            9/26/2024   TB Screening   Were you  "born outside of the [place]? No            Today's PHQ-2 Score:       9/26/2024     6:39 AM   PHQ-2 ( 1999 [employer])   Q1: Little interest or pleasure in doing things 0   Q2: Feeling down, depressed or hopeless 0   PHQ-2 Score 0   Q1: Little interest or pleasure in doing things Not at all   Q2: Feeling down, depressed or hopeless Not at all   PHQ-2 Score 0           9/26/2024   Substance Use   Alcohol more than 3/day or more than 7/wk No   Do you use any other substances recreationally? (!) PRESCRIPTION DRUGS        Social History     Tobacco Use    Smoking status: Never     Passive exposure: Never    Smokeless tobacco: Never   Vaping Use    Vaping status: Never Used   Substance Use Topics    Alcohol use: No    Drug use: No           9/26/2024   STI Screening   New sexual partner(s) since last STI/HIV test? No      Last PSA:   PSA   Date Value Ref Range Status   02/15/2021 0.31 0 - 4 ug/L Final     Comment:     Assay Method:  Chemiluminescence using Siemens Vista analyzer     Prostate Specific Antigen Screen   Date Value Ref Range Status   12/15/2023 0.23 0.00 - 3.50 ng/mL Final   08/09/2022 0.29 0.00 - 4.00 ug/L Final     ASCVD Risk   The 10-year ASCVD risk score (Guido TIDWELL, et al., 2019) is: 23.8%    Values used to calculate the score:      Age: 53 years      Sex: Male      Is Non- : No      Diabetic: Yes      Tobacco smoker: No      Systolic Blood Pressure: 171 mmHg      Is BP treated: Yes      HDL Cholesterol: 37 mg/dL      Total Cholesterol: 190 mg/dL           Reviewed and updated as needed this visit by Provider                        Objective    Exam  BP (!) 171/98 (BP Location: Left arm, Patient Position: Chair, Cuff Size: Adult Large)   Pulse 86   Temp 97.8  F (36.6  C) (Temporal)   Resp 18   Ht 1.803 m (5' 11\")   Wt 122.5 kg (270 lb 2 oz)   SpO2 98%   BMI 37.67 kg/m     Estimated body mass index is 37.67 kg/m  as calculated from the following:    Height as of this " "encounter: 1.803 m (5' 11\").    Weight as of this encounter: 122.5 kg (270 lb 2 oz).    Physical Exam  GENERAL: alert and no distress  EYES: Eyes grossly normal to inspection, PERRL and conjunctivae and sclerae normal  HENT: ear canals and TM's normal, nose and mouth without ulcers or lesions  NECK: no adenopathy, no asymmetry, masses, or scars  RESP: lungs clear to auscultation - no rales, rhonchi or wheezes  CV: regular rate and rhythm, normal S1 S2, no S3 or S4, no murmur, click or rub, no peripheral edema  ABDOMEN: soft, nontender, no hepatosplenomegaly, no masses and bowel sounds normal  MS: no gross musculoskeletal defects noted, no edema  SKIN: no suspicious lesions or rashes  NEURO: Normal strength and tone, mentation intact and speech normal  PSYCH: mentation appears normal, affect normal/bright  Foot exam okay here bilat       1. Routine general medical examination at a health care facility    2. Screen for colon cancer    3. Type 2 diabetes mellitus with chronic kidney disease, without long-term current use of insulin, unspecified CKD stage (H)    4. Screening for prostate cancer    5. Fatigue, unspecified type    6. Essential hypertension with goal blood pressure less than 140/90    7. Type 2 diabetes mellitus with retinopathy, without long-term current use of insulin, macular edema presence unspecified, unspecified laterality, unspecified retinopathy severity (H)    8. Hypertension, renal    9. Hyperlipidemia LDL goal <100    10. Hypertension, essential    11. Proteinuria, unspecified type    12. Stage 3a chronic kidney disease (H)        Discussed multiple issues with patient  Wt is ongoing issue  Keep working on healthy diet/exercise and wt loss  Need better blood pressure control but already on mulitple meds  Increase spirionolactone and keep other blood pressure meds as is  Patient to schedule with nephrology  Also patient to schedule with diab ed  Refill meds  FIT test  No std concern  Patient to " schedule eye exam        Signed Electronically by: Sean Dougherty MD

## 2024-09-27 NOTE — RESULT ENCOUNTER NOTE
You still have a lot of protein in the urine, but not as much as last time.    Kidney blood test ( creatinine )  is higher but some of this could be dehydration.  Increase fluid intake some.    Schedule with nephrology ( kidney specialist ).    The diabetes test ( hemoglobin a1c ) is still high but better than last time.  See the diabetes educator.    Other labs are okay/ stable.    Sean Dougherty MD    Please mail letter and results to patient   Thanks  Sean Dougherty MD

## 2024-12-13 ENCOUNTER — TELEPHONE (OUTPATIENT)
Dept: FAMILY MEDICINE | Facility: CLINIC | Age: 54
End: 2024-12-13
Payer: COMMERCIAL

## 2024-12-24 ENCOUNTER — TELEPHONE (OUTPATIENT)
Dept: FAMILY MEDICINE | Facility: CLINIC | Age: 54
End: 2024-12-24
Payer: COMMERCIAL

## 2024-12-24 DIAGNOSIS — E11.22 TYPE 2 DIABETES MELLITUS WITH CHRONIC KIDNEY DISEASE, WITHOUT LONG-TERM CURRENT USE OF INSULIN, UNSPECIFIED CKD STAGE (H): Primary | ICD-10-CM

## 2024-12-24 NOTE — TELEPHONE ENCOUNTER
Patient calling, his company is changing insurance, Victoza will no longer be covered and he was told to see if Dr. Dougherty will change him to Ozempic.    Additionally, Jardiance will no longer be covered, would cost patient $600 out of pocket.   Needs alternative if there is one.    Patient did get a 30 day supply of Jardiance to give us time to make a change (or possibly PA??), insurance would not refill the victoza, only has one week left of that so needs alternative sent soon (?Ozempic?).    Patient says he's been on ozempic in the past, switched because of previous insurance issue.      Routed to Dr. Dougherty to address.    Caterina ROSE RN  [facility]

## 2024-12-26 NOTE — TELEPHONE ENCOUNTER
Called patient and notified him of prescription for ozempic.  He stated that the pharmacy called and his out of pocket cost for this will be $900 and the Jardiance will be $600 with the new insurance  Patient will contact insurance and stated that he will try to go back on his previous insurance.  He will call with updates    Annabelle Medina RN  [facility] [place]

## 2024-12-26 NOTE — TELEPHONE ENCOUNTER
I sent in prescription for semaglutide ( ozempic ) 1 mg weekly    Please inform patient     Hopefully this will be covered    Sean Dougherty MD

## 2024-12-30 NOTE — TELEPHONE ENCOUNTER
Closing encounter. We can re-open when patient returns call.     Thanks,  GRACIA Powers  [facility]

## 2025-01-09 ENCOUNTER — TELEPHONE (OUTPATIENT)
Dept: FAMILY MEDICINE | Facility: CLINIC | Age: 55
End: 2025-01-09
Payer: COMMERCIAL

## 2025-01-09 NOTE — LETTER
January 9, 2025    To  Davie Banuelos  [address] [facility] [address]    Your team at [facility] cares about your health. We have reviewed your chart and based on our findings; we are making the following recommendations to better manage your health.     You are in particular need of attention regarding the following:     HYPERTENSION FOLLOW UP: Office Visit  Blood pressure check with nurse  Visit your closest [place] pharmacy for BP check  Use your home Blood Pressure monitor and call us with your results or send them through [employer].    If you have already completed these items, please contact the clinic via phone or   [employer] so your care team can review and update your records. Thank you for   choosing [facility] for your healthcare needs. For any questions,   concerns, or to schedule an appointment please contact our clinic.    Healthy Regards,      Your [facility] Care Team            Electronically signed

## 2025-01-09 NOTE — TELEPHONE ENCOUNTER
Patient Quality Outreach    Patient is due for the following:   Diabetes -  Eye Exam and Foot Exam  Hypertension -  BP check    Action(s) Taken:   Schedule a office visit for Blood pressure check    Type of outreach:    Sent letter.    Questions for provider review:    None           Marisela Milligan, FARHAN  Chart routed to chart closed.

## 2025-02-25 ENCOUNTER — TELEPHONE (OUTPATIENT)
Dept: FAMILY MEDICINE | Facility: CLINIC | Age: 55
End: 2025-02-25
Payer: COMMERCIAL

## 2025-02-25 ENCOUNTER — NURSE TRIAGE (OUTPATIENT)
Dept: FAMILY MEDICINE | Facility: CLINIC | Age: 55
End: 2025-02-25
Payer: COMMERCIAL

## 2025-02-25 NOTE — TELEPHONE ENCOUNTER
Patient's girlfriend calling, no consent to communicate so Davie gave verbal consent to communicate on the phone.   Girlfriend says Davie is on ozempic and had vomited a few times a week ago, he again started vomiting at 8 am today, last emesis was 1:30 pm, he fell in the bathroom earlier today as he is feeling so weak; no injury noted.    Denies abdominal pain or blood in vomit; has also has diarrhea and has had 5 liquid stools today, loose stools started this AM.  Has urinated today.    See triage below, home care advised.     Patient and girlfriend think the diarrhea and vomiting might have started due to patient making a mistake and taking his ozempic dose 2 days early.   Was due tomorrow but he did it by mistake yesterday.    Routed to Dr. Dougherty, does that seem like a likely reason for the diarrhea and vomiting.    They will monitor hydration and blood sugar, to ER if not improving or if having very low blood sugars or symptoms.    Caterina ROSE RN  [facility] Triage        Reason for Disposition   MILD-MODERATE diarrhea (e.g., 1-6 times / day more than normal)    Additional Information   Negative: Shock suspected (e.g., cold/pale/clammy skin, too weak to stand, low BP, rapid pulse)   Negative: Difficult to awaken or acting confused (e.g., disoriented, slurred speech)   Negative: Sounds like a life-threatening emergency to the triager   Negative: Vomiting also present and worse than the diarrhea   Negative: Blood in stool and without diarrhea   Negative: Diarrhea begins while taking an antibiotic by mouth (oral antibiotic)   Negative: SEVERE abdominal pain (e.g., excruciating) and present > 1 hour   Negative: SEVERE abdominal pain and age > 60 years   Negative: Bloody, black, or tarry bowel movements  (Exception: Chronic-unchanged black-grey bowel movements and is taking iron pills or Pepto-Bismol.)   Negative: SEVERE diarrhea (e.g., 7 or more times / day more than normal) and age > 60 years    "Negative: Constant abdominal pain lasting > 2 hours   Negative: Drinking very little and dehydration suspected (e.g., no urine > 12 hours, very dry mouth, very lightheaded)   Negative: Patient sounds very sick or weak to the triager   Negative: SEVERE diarrhea (e.g., 7 or more times / day more than normal) and present > 24 hours (1 day)   Negative: MODERATE diarrhea (e.g., 4-6 times / day more than normal) and present > 48 hours (2 days)   Negative: MODERATE diarrhea (e.g., 4-6 times / day more than normal) and age > 70 years   Negative: Abdominal pain  (Exceptions: Pain clears completely with each passage of diarrhea stool,  or symptoms similar to previously diagnosed irritable bowel syndrome.)   Negative: Fever > 101 F (38.3 C)   Negative: Blood in the stool  (Exception: Only on toilet paper. Reason: Diarrhea can cause rectal irritation with blood on wiping.)   Negative: Mucus or pus in stool has been present > 2 days and diarrhea is more than mild   Negative: Weak immune system (e.g., HIV positive, cancer chemo, splenectomy, organ transplant, chronic steroids)   Negative: Travel to a foreign country in past month   Negative: Recent antibiotic therapy (i.e., within last 2 months) and diarrhea present > 3 days since antibiotic was stopped   Negative: Recent hospitalization and diarrhea present > 3 days   Negative: Tube feedings (e.g., nasogastric, g-tube, j-tube)   Negative: MILD diarrhea (e.g., 1-3 or more stools than normal in past 24 hours) diarrhea and present > 7 days  (Exception: Chronic diarrhea that is not worse.)   Negative: Patient wants to be seen   Negative: Diarrhea is a chronic symptom (recurrent or ongoing AND lasting > 4 weeks)    Answer Assessment - Initial Assessment Questions  1. DIARRHEA SEVERITY: \"How bad is the diarrhea?\" \"How many more stools have you had in the past 24 hours than normal?\"       5 liquid stools  2. ONSET: \"When did the diarrhea begin?\"      10 am today  3. STOOL DESCRIPTION:  " "\"How loose or watery is the diarrhea?\" \"What is the stool color?\" \"Is there any blood or mucous in the stool?\"      greenish  4. VOMITING: \"Are you also vomiting?\" If Yes, ask: \"How many times in the past 24 hours?\"       5-6, last was around 1 pm  5. ABDOMEN PAIN: \"Are you having any abdomen pain?\" If Yes, ask: \"What does it feel like?\" (e.g., crampy, dull, intermittent, constant)       No abdominal pain  6. ABDOMEN PAIN SEVERITY: If present, ask: \"How bad is the pain?\"  (e.g., Scale 1-10; mild, moderate, or severe)      N/a  7. ORAL INTAKE: If vomiting, \"Have you been able to drink liquids?\" \"How much liquids have you had in the past 24 hours?\"      Sipping gatorade and ate some applesauce  8. HYDRATION: \"Any signs of dehydration?\" (e.g., dry mouth [not just dry lips], too weak to stand, dizziness, new weight loss) \"When did you last urinate?\"      Fell earlier today, was not dizzy, just weak  9. EXPOSURE: \"Have you traveled to a foreign country recently?\" \"Have you been exposed to anyone with diarrhea?\" \"Could you have eaten any food that was spoiled?\"      no  10. ANTIBIOTIC USE: \"Are you taking antibiotics now or have you taken antibiotics in the past 2 months?\"        no  11. OTHER SYMPTOMS: \"Do you have any other symptoms?\" (e.g., fever, blood in stool)        no  12. PREGNANCY: \"Is there any chance you are pregnant?\" \"When was your last menstrual period?\"        N/a    Protocols used: Diarrhea-A-[place]    "

## 2025-02-25 NOTE — TELEPHONE ENCOUNTER
Patients girlfriend Alda calling in. No consent to communicate on file. RN did attempt to reach out to patient to get verbal consent but patient did not answer and unable to leave a VM.      Patients girlfriend wants to leave message for Dr. Dougherty.    Patient has been experiencing nausea and vomiting since 8 am this morning. Patient also has decreased appetite. She states this last month patient has started taking     Semaglutide, 1 MG/DOSE, (OZEMPIC) 4 MG/3ML pen 9 mL 1 12/26/2024 -- No   Sig - Route: Inject 1 mg subcutaneously every 7 days. - Subcutaneous     She is thinking this is a side effect to the medication. She states patient gave an injection yesterday. She also reports patient had similar sx after giving injection last week.     She knows appetite suppression can be part of medication but states patient just does not seem to want to eat anything and very little appetite.     Please advise.       Josefa Lopez RN on 2/25/2025 at 12:05 PM

## 2025-02-26 NOTE — TELEPHONE ENCOUNTER
Attempt #1 to call patient.     RN left voicemail and requested return call to [facility] at [phone] and ask to speak to a nurse.     Lauren Bae RN   [facility]

## 2025-02-26 NOTE — TELEPHONE ENCOUNTER
Called pt and his girlfriend Alda answered. There is no consent to communicate on file for her. I advised her that she can fill out a consent to communicate form at the clinic at any time. She said that she is at work right now and the pt is at home. She said she will be at home with the patient at 5pm this afternoon.     I also called the patient's listed mobile number and left a generic VM requesting a return call to the clinic.     Arelis HAMEED RN  [facility]

## 2025-02-26 NOTE — TELEPHONE ENCOUNTER
Yes the ozempic may certainly be contributing to symptoms    Hold this med for a couple weeks, then restart    To emergency room if symptoms real bad    Also- there was another message about this, advised phone visit if needed    Okay to schedule this    Please inform patient/girlfriend    Sean Dougherty MD

## 2025-02-27 NOTE — TELEPHONE ENCOUNTER
Called patient's home number.  Girlfriend, Alda picked up.  She stated that patient was feeling much better and had gone to work today.  She will relay provider's message to patient    Annabelle Medina RN  [facility]

## 2025-04-28 DIAGNOSIS — E11.319 TYPE 2 DIABETES MELLITUS WITH RETINOPATHY, WITHOUT LONG-TERM CURRENT USE OF INSULIN, MACULAR EDEMA PRESENCE UNSPECIFIED, UNSPECIFIED LATERALITY, UNSPECIFIED RETINOPATHY SEVERITY (H): ICD-10-CM

## 2025-04-28 NOTE — TELEPHONE ENCOUNTER
1st attempt: Called and left message for patient to return call clinic.    -if patient return call to clinic please inform patient of message and assist with scheduling an appointment.  Veena Carrington, CMA

## 2025-08-27 ENCOUNTER — PATIENT OUTREACH (OUTPATIENT)
Dept: CARE COORDINATION | Facility: CLINIC | Age: 55
End: 2025-08-27
Payer: COMMERCIAL